# Patient Record
Sex: MALE | Race: WHITE | Employment: FULL TIME | URBAN - METROPOLITAN AREA
[De-identification: names, ages, dates, MRNs, and addresses within clinical notes are randomized per-mention and may not be internally consistent; named-entity substitution may affect disease eponyms.]

---

## 2017-02-09 ENCOUNTER — HOSPITAL ENCOUNTER (OUTPATIENT)
Age: 57
Setting detail: SPECIMEN
Discharge: HOME | End: 2017-02-09
Payer: COMMERCIAL

## 2017-02-09 DIAGNOSIS — E29.1 HYPOGONADISM MALE: ICD-10-CM

## 2017-02-09 DIAGNOSIS — E78.00 HYPERCHOLESTEROLEMIA: ICD-10-CM

## 2017-02-09 LAB
ABSOLUTE EOS #: 0.7 K/UL (ref 0–0.4)
ABSOLUTE LYMPH #: 1.1 K/UL (ref 1–4.8)
ABSOLUTE MONO #: 0.6 K/UL (ref 0.1–1.2)
ALT SERPL-CCNC: 27 U/L (ref 5–41)
AST SERPL-CCNC: 23 U/L
BASOPHILS # BLD: 1 % (ref 0–2)
BASOPHILS ABSOLUTE: 0 K/UL (ref 0–0.2)
CHOLESTEROL/HDL RATIO: 4.1
CHOLESTEROL: 206 MG/DL
DIFFERENTIAL TYPE: ABNORMAL
EOSINOPHILS RELATIVE PERCENT: 14 % (ref 1–4)
ESTRADIOL LEVEL: 8 PG/ML (ref 27–52)
HCT VFR BLD CALC: 46.5 % (ref 41–53)
HDLC SERPL-MCNC: 50 MG/DL
HEMOGLOBIN: 15.9 G/DL (ref 13.5–17.5)
LDL CHOLESTEROL: 130 MG/DL (ref 0–130)
LYMPHOCYTES # BLD: 21 % (ref 24–44)
MCH RBC QN AUTO: 30.8 PG (ref 26–34)
MCHC RBC AUTO-ENTMCNC: 34.1 G/DL (ref 31–37)
MCV RBC AUTO: 90.1 FL (ref 80–100)
MONOCYTES # BLD: 13 % (ref 2–11)
PDW BLD-RTO: 14.3 % (ref 12.5–15.4)
PLATELET # BLD: 169 K/UL (ref 140–450)
PLATELET ESTIMATE: ABNORMAL
PMV BLD AUTO: 8.7 FL (ref 6–12)
PROSTATE SPECIFIC ANTIGEN: 0.83 UG/L
RBC # BLD: 5.16 M/UL (ref 4.5–5.9)
RBC # BLD: ABNORMAL 10*6/UL
SEG NEUTROPHILS: 51 % (ref 36–66)
SEGMENTED NEUTROPHILS ABSOLUTE COUNT: 2.7 K/UL (ref 1.8–7.7)
SEX HORMONE BINDING GLOBULIN: 27 NMOL/L (ref 11–80)
TESTOSTERONE FREE-NONMALE: 48.6 PG/ML (ref 47–244)
TESTOSTERONE TOTAL: 226 NG/DL (ref 220–1000)
TRIGL SERPL-MCNC: 128 MG/DL
VLDLC SERPL CALC-MCNC: ABNORMAL MG/DL (ref 1–30)
WBC # BLD: 5.2 K/UL (ref 3.5–11)
WBC # BLD: ABNORMAL 10*3/UL

## 2017-07-18 ENCOUNTER — HOSPITAL ENCOUNTER (OUTPATIENT)
Age: 57
Setting detail: SPECIMEN
Discharge: HOME | End: 2017-07-18
Payer: COMMERCIAL

## 2017-07-18 DIAGNOSIS — E29.1 HYPOGONADISM MALE: ICD-10-CM

## 2017-07-18 LAB
ABSOLUTE EOS #: 0.6 K/UL (ref 0–0.4)
ABSOLUTE LYMPH #: 1.3 K/UL (ref 1–4.8)
ABSOLUTE MONO #: 0.6 K/UL (ref 0.1–1.2)
ALT SERPL-CCNC: 43 U/L (ref 5–41)
AST SERPL-CCNC: 26 U/L
BASOPHILS # BLD: 0 %
BASOPHILS ABSOLUTE: 0 K/UL (ref 0–0.2)
DIFFERENTIAL TYPE: ABNORMAL
EOSINOPHILS RELATIVE PERCENT: 10 %
ESTRADIOL LEVEL: 7 PG/ML (ref 27–52)
HCT VFR BLD CALC: 44.4 % (ref 41–53)
HEMOGLOBIN: 15.6 G/DL (ref 13.5–17.5)
LYMPHOCYTES # BLD: 22 %
MCH RBC QN AUTO: 30.9 PG (ref 26–34)
MCHC RBC AUTO-ENTMCNC: 35.1 G/DL (ref 31–37)
MCV RBC AUTO: 88 FL (ref 80–100)
MONOCYTES # BLD: 11 %
PDW BLD-RTO: 13.1 % (ref 12.5–15.4)
PLATELET # BLD: 176 K/UL (ref 140–450)
PLATELET ESTIMATE: ABNORMAL
PMV BLD AUTO: 8.8 FL (ref 6–12)
PROSTATE SPECIFIC ANTIGEN: 0.77 UG/L
RBC # BLD: 5.04 M/UL (ref 4.5–5.9)
RBC # BLD: ABNORMAL 10*6/UL
SEG NEUTROPHILS: 57 %
SEGMENTED NEUTROPHILS ABSOLUTE COUNT: 3.3 K/UL (ref 1.8–7.7)
SEX HORMONE BINDING GLOBULIN: 28 NMOL/L (ref 11–80)
TESTOSTERONE FREE-NONMALE: 31.6 PG/ML (ref 47–244)
TESTOSTERONE TOTAL: 154 NG/DL (ref 220–1000)
WBC # BLD: 5.8 K/UL (ref 3.5–11)
WBC # BLD: ABNORMAL 10*3/UL

## 2017-11-29 ENCOUNTER — HOSPITAL ENCOUNTER (OUTPATIENT)
Age: 57
Setting detail: SPECIMEN
Discharge: HOME | End: 2017-11-29
Payer: COMMERCIAL

## 2017-11-29 DIAGNOSIS — E29.1 HYPOGONADISM MALE: ICD-10-CM

## 2017-11-29 LAB
ABSOLUTE EOS #: 0.34 K/UL (ref 0–0.44)
ABSOLUTE IMMATURE GRANULOCYTE: 0.03 K/UL (ref 0–0.3)
ABSOLUTE LYMPH #: 1.13 K/UL (ref 1.1–3.7)
ABSOLUTE MONO #: 0.59 K/UL (ref 0.1–1.2)
ALT SERPL-CCNC: 28 U/L (ref 5–41)
AST SERPL-CCNC: 22 U/L
BASOPHILS # BLD: 1 % (ref 0–2)
BASOPHILS ABSOLUTE: 0.03 K/UL (ref 0–0.2)
DIFFERENTIAL TYPE: ABNORMAL
EOSINOPHILS RELATIVE PERCENT: 7 % (ref 1–4)
ESTRADIOL LEVEL: <5 PG/ML (ref 27–52)
HCT VFR BLD CALC: 47.5 % (ref 40.7–50.3)
HEMOGLOBIN: 15.9 G/DL (ref 13–17)
IMMATURE GRANULOCYTES: 1 %
LYMPHOCYTES # BLD: 22 % (ref 24–43)
MCH RBC QN AUTO: 30.2 PG (ref 25.2–33.5)
MCHC RBC AUTO-ENTMCNC: 33.5 G/DL (ref 28.4–34.8)
MCV RBC AUTO: 90.1 FL (ref 82.6–102.9)
MONOCYTES # BLD: 11 % (ref 3–12)
PDW BLD-RTO: 12.2 % (ref 11.8–14.4)
PLATELET # BLD: 186 K/UL (ref 138–453)
PLATELET ESTIMATE: ABNORMAL
PMV BLD AUTO: 10.4 FL (ref 8.1–13.5)
PROSTATE SPECIFIC ANTIGEN: 0.91 UG/L
RBC # BLD: 5.27 M/UL (ref 4.21–5.77)
RBC # BLD: ABNORMAL 10*6/UL
SEG NEUTROPHILS: 58 % (ref 36–65)
SEGMENTED NEUTROPHILS ABSOLUTE COUNT: 3.13 K/UL (ref 1.5–8.1)
SEX HORMONE BINDING GLOBULIN: 30 NMOL/L (ref 11–80)
TESTOSTERONE FREE-NONMALE: 23 PG/ML (ref 47–244)
TESTOSTERONE TOTAL: 118 NG/DL (ref 220–1000)
WBC # BLD: 5.3 K/UL (ref 3.5–11.3)
WBC # BLD: ABNORMAL 10*3/UL

## 2018-03-26 ENCOUNTER — HOSPITAL ENCOUNTER (OUTPATIENT)
Age: 58
Setting detail: SPECIMEN
Discharge: HOME | End: 2018-03-26
Payer: COMMERCIAL

## 2018-03-27 LAB
CULTURE: NO GROWTH
CULTURE: NORMAL
Lab: NORMAL
SPECIMEN DESCRIPTION: NORMAL
STATUS: NORMAL

## 2018-06-06 ENCOUNTER — HOSPITAL ENCOUNTER (OUTPATIENT)
Age: 58
Setting detail: SPECIMEN
Discharge: HOME | End: 2018-06-06
Payer: COMMERCIAL

## 2018-06-06 DIAGNOSIS — E29.1 HYPOGONADISM MALE: ICD-10-CM

## 2018-06-06 LAB
ABSOLUTE EOS #: 0.46 K/UL (ref 0–0.44)
ABSOLUTE IMMATURE GRANULOCYTE: <0.03 K/UL (ref 0–0.3)
ABSOLUTE LYMPH #: 1.15 K/UL (ref 1.1–3.7)
ABSOLUTE MONO #: 0.61 K/UL (ref 0.1–1.2)
ALT SERPL-CCNC: 31 U/L (ref 5–41)
AST SERPL-CCNC: 23 U/L
BASOPHILS # BLD: 1 % (ref 0–2)
BASOPHILS ABSOLUTE: 0.04 K/UL (ref 0–0.2)
DIFFERENTIAL TYPE: ABNORMAL
EOSINOPHILS RELATIVE PERCENT: 8 % (ref 1–4)
ESTRADIOL LEVEL: 17 PG/ML (ref 27–52)
HCT VFR BLD CALC: 48.6 % (ref 40.7–50.3)
HEMOGLOBIN: 16.1 G/DL (ref 13–17)
IMMATURE GRANULOCYTES: 0 %
LYMPHOCYTES # BLD: 21 % (ref 24–43)
MCH RBC QN AUTO: 30 PG (ref 25.2–33.5)
MCHC RBC AUTO-ENTMCNC: 33.1 G/DL (ref 28.4–34.8)
MCV RBC AUTO: 90.7 FL (ref 82.6–102.9)
MONOCYTES # BLD: 11 % (ref 3–12)
NRBC AUTOMATED: 0 PER 100 WBC
PDW BLD-RTO: 12.7 % (ref 11.8–14.4)
PLATELET # BLD: 190 K/UL (ref 138–453)
PLATELET ESTIMATE: ABNORMAL
PMV BLD AUTO: 10.4 FL (ref 8.1–13.5)
PROSTATE SPECIFIC ANTIGEN: 1.11 UG/L
RBC # BLD: 5.36 M/UL (ref 4.21–5.77)
RBC # BLD: ABNORMAL 10*6/UL
SEG NEUTROPHILS: 59 % (ref 36–65)
SEGMENTED NEUTROPHILS ABSOLUTE COUNT: 3.18 K/UL (ref 1.5–8.1)
SEX HORMONE BINDING GLOBULIN: 28 NMOL/L (ref 11–80)
TESTOSTERONE FREE-NONMALE: 73.9 PG/ML (ref 47–244)
TESTOSTERONE TOTAL: 338 NG/DL (ref 220–1000)
WBC # BLD: 5.5 K/UL (ref 3.5–11.3)
WBC # BLD: ABNORMAL 10*3/UL

## 2018-10-01 ENCOUNTER — HOSPITAL ENCOUNTER (OUTPATIENT)
Dept: GENERAL RADIOLOGY | Facility: CLINIC | Age: 58
Discharge: HOME | End: 2018-10-03
Payer: COMMERCIAL

## 2018-10-01 ENCOUNTER — HOSPITAL ENCOUNTER (OUTPATIENT)
Age: 58
Setting detail: SPECIMEN
Discharge: HOME | End: 2018-10-01
Payer: COMMERCIAL

## 2018-10-01 DIAGNOSIS — M54.50 RIGHT-SIDED LOW BACK PAIN WITHOUT SCIATICA, UNSPECIFIED CHRONICITY: ICD-10-CM

## 2018-10-01 PROCEDURE — 72100 X-RAY EXAM L-S SPINE 2/3 VWS: CPT

## 2018-10-01 PROCEDURE — 72070 X-RAY EXAM THORAC SPINE 2VWS: CPT

## 2018-10-03 LAB
CULTURE: NO GROWTH
Lab: NORMAL
SPECIMEN DESCRIPTION: NORMAL
STATUS: NORMAL

## 2018-11-01 ENCOUNTER — HOSPITAL ENCOUNTER (OUTPATIENT)
Age: 58
Setting detail: SPECIMEN
Discharge: HOME | End: 2018-11-01
Payer: COMMERCIAL

## 2018-11-01 DIAGNOSIS — E29.1 HYPOGONADISM MALE: ICD-10-CM

## 2018-11-01 LAB
ABSOLUTE EOS #: 0.38 K/UL (ref 0–0.44)
ABSOLUTE IMMATURE GRANULOCYTE: <0.03 K/UL (ref 0–0.3)
ABSOLUTE LYMPH #: 0.99 K/UL (ref 1.1–3.7)
ABSOLUTE MONO #: 0.45 K/UL (ref 0.1–1.2)
ALT SERPL-CCNC: 37 U/L (ref 5–41)
AST SERPL-CCNC: 27 U/L
BASOPHILS # BLD: 1 % (ref 0–2)
BASOPHILS ABSOLUTE: 0.04 K/UL (ref 0–0.2)
DIFFERENTIAL TYPE: ABNORMAL
EOSINOPHILS RELATIVE PERCENT: 8 % (ref 1–4)
ESTRADIOL LEVEL: 18 PG/ML (ref 27–52)
HCT VFR BLD CALC: 50.2 % (ref 40.7–50.3)
HEMOGLOBIN: 17.2 G/DL (ref 13–17)
IMMATURE GRANULOCYTES: 0 %
LYMPHOCYTES # BLD: 21 % (ref 24–43)
MCH RBC QN AUTO: 31.1 PG (ref 25.2–33.5)
MCHC RBC AUTO-ENTMCNC: 34.3 G/DL (ref 28.4–34.8)
MCV RBC AUTO: 90.8 FL (ref 82.6–102.9)
MONOCYTES # BLD: 10 % (ref 3–12)
NRBC AUTOMATED: 0 PER 100 WBC
PDW BLD-RTO: 12.2 % (ref 11.8–14.4)
PLATELET # BLD: 171 K/UL (ref 138–453)
PLATELET ESTIMATE: ABNORMAL
PMV BLD AUTO: 10.7 FL (ref 8.1–13.5)
PROSTATE SPECIFIC ANTIGEN: 1.19 UG/L
RBC # BLD: 5.53 M/UL (ref 4.21–5.77)
RBC # BLD: ABNORMAL 10*6/UL
SEG NEUTROPHILS: 60 % (ref 36–65)
SEGMENTED NEUTROPHILS ABSOLUTE COUNT: 2.77 K/UL (ref 1.5–8.1)
SEX HORMONE BINDING GLOBULIN: 28 NMOL/L (ref 11–80)
TESTOSTERONE FREE-NONMALE: 64.8 PG/ML (ref 47–244)
TESTOSTERONE TOTAL: 300 NG/DL (ref 220–1000)
WBC # BLD: 4.7 K/UL (ref 3.5–11.3)
WBC # BLD: ABNORMAL 10*3/UL

## 2018-11-02 ENCOUNTER — HOSPITAL ENCOUNTER (OUTPATIENT)
Age: 58
Setting detail: SPECIMEN
Discharge: HOME | End: 2018-11-02
Payer: COMMERCIAL

## 2018-11-09 LAB — SURGICAL PATHOLOGY REPORT: NORMAL

## 2019-05-09 ENCOUNTER — HOSPITAL ENCOUNTER (OUTPATIENT)
Age: 59
Setting detail: SPECIMEN
Discharge: HOME | End: 2019-05-09
Payer: COMMERCIAL

## 2019-05-09 DIAGNOSIS — E29.1 HYPOGONADISM MALE: ICD-10-CM

## 2019-05-09 LAB
ABSOLUTE EOS #: 0.27 K/UL (ref 0–0.44)
ABSOLUTE IMMATURE GRANULOCYTE: <0.03 K/UL (ref 0–0.3)
ABSOLUTE LYMPH #: 0.94 K/UL (ref 1.1–3.7)
ABSOLUTE MONO #: 0.58 K/UL (ref 0.1–1.2)
ALT SERPL-CCNC: 32 U/L (ref 5–41)
AST SERPL-CCNC: 26 U/L
BASOPHILS # BLD: 1 % (ref 0–2)
BASOPHILS ABSOLUTE: 0.04 K/UL (ref 0–0.2)
DIFFERENTIAL TYPE: ABNORMAL
EOSINOPHILS RELATIVE PERCENT: 5 % (ref 1–4)
ESTRADIOL LEVEL: 18 PG/ML (ref 27–52)
HCT VFR BLD CALC: 50.7 % (ref 40.7–50.3)
HEMOGLOBIN: 16.3 G/DL (ref 13–17)
IMMATURE GRANULOCYTES: 0 %
LYMPHOCYTES # BLD: 19 % (ref 24–43)
MCH RBC QN AUTO: 30.1 PG (ref 25.2–33.5)
MCHC RBC AUTO-ENTMCNC: 32.1 G/DL (ref 28.4–34.8)
MCV RBC AUTO: 93.7 FL (ref 82.6–102.9)
MONOCYTES # BLD: 12 % (ref 3–12)
NRBC AUTOMATED: 0 PER 100 WBC
PDW BLD-RTO: 13 % (ref 11.8–14.4)
PLATELET # BLD: 167 K/UL (ref 138–453)
PLATELET ESTIMATE: ABNORMAL
PMV BLD AUTO: 10.8 FL (ref 8.1–13.5)
PROSTATE SPECIFIC ANTIGEN: 1.17 UG/L
RBC # BLD: 5.41 M/UL (ref 4.21–5.77)
RBC # BLD: ABNORMAL 10*6/UL
SEG NEUTROPHILS: 63 % (ref 36–65)
SEGMENTED NEUTROPHILS ABSOLUTE COUNT: 3.2 K/UL (ref 1.5–8.1)
SEX HORMONE BINDING GLOBULIN: 30 NMOL/L (ref 11–80)
TESTOSTERONE FREE-NONMALE: 81.1 PG/ML (ref 47–244)
TESTOSTERONE TOTAL: 380 NG/DL (ref 220–1000)
WBC # BLD: 5.1 K/UL (ref 3.5–11.3)
WBC # BLD: ABNORMAL 10*3/UL

## 2019-06-13 ENCOUNTER — HOSPITAL ENCOUNTER (OUTPATIENT)
Age: 59
Setting detail: SPECIMEN
Discharge: HOME | End: 2019-06-13
Payer: COMMERCIAL

## 2019-06-13 DIAGNOSIS — I10 ESSENTIAL HYPERTENSION: ICD-10-CM

## 2019-06-13 DIAGNOSIS — M10.9 GOUT INVOLVING TOE OF RIGHT FOOT, UNSPECIFIED CAUSE, UNSPECIFIED CHRONICITY: ICD-10-CM

## 2019-06-13 LAB
ANION GAP SERPL CALCULATED.3IONS-SCNC: 16 MMOL/L (ref 9–17)
BUN BLDV-MCNC: 17 MG/DL (ref 8–23)
BUN/CREAT BLD: ABNORMAL (ref 9–20)
CALCIUM SERPL-MCNC: 9.5 MG/DL (ref 8.6–10.4)
CHLORIDE BLD-SCNC: 104 MMOL/L (ref 98–107)
CO2: 25 MMOL/L (ref 20–31)
CREAT SERPL-MCNC: 0.74 MG/DL (ref 0.7–1.2)
GFR AFRICAN AMERICAN: >60 ML/MIN
GFR NON-AFRICAN AMERICAN: >60 ML/MIN
GFR SERPL CREATININE-BSD FRML MDRD: ABNORMAL ML/MIN/{1.73_M2}
GFR SERPL CREATININE-BSD FRML MDRD: ABNORMAL ML/MIN/{1.73_M2}
GLUCOSE FASTING: 86 MG/DL (ref 70–99)
POTASSIUM SERPL-SCNC: 4.8 MMOL/L (ref 3.7–5.3)
SODIUM BLD-SCNC: 145 MMOL/L (ref 135–144)
URIC ACID: 8 MG/DL (ref 3.4–7)

## 2019-11-21 ENCOUNTER — HOSPITAL ENCOUNTER (OUTPATIENT)
Age: 59
Setting detail: SPECIMEN
Discharge: HOME | End: 2019-11-21
Payer: COMMERCIAL

## 2019-11-21 DIAGNOSIS — E29.1 HYPOGONADISM MALE: ICD-10-CM

## 2019-11-21 LAB
ABSOLUTE EOS #: 0.36 K/UL (ref 0–0.44)
ABSOLUTE IMMATURE GRANULOCYTE: <0.03 K/UL (ref 0–0.3)
ABSOLUTE LYMPH #: 1.08 K/UL (ref 1.1–3.7)
ABSOLUTE MONO #: 0.58 K/UL (ref 0.1–1.2)
ALT SERPL-CCNC: 33 U/L (ref 5–41)
AST SERPL-CCNC: 26 U/L
BASOPHILS # BLD: 1 % (ref 0–2)
BASOPHILS ABSOLUTE: 0.03 K/UL (ref 0–0.2)
DIFFERENTIAL TYPE: ABNORMAL
EOSINOPHILS RELATIVE PERCENT: 8 % (ref 1–4)
ESTRADIOL LEVEL: 24 PG/ML (ref 27–52)
HCT VFR BLD CALC: 45.3 % (ref 40.7–50.3)
HEMOGLOBIN: 15.7 G/DL (ref 13–17)
IMMATURE GRANULOCYTES: 0 %
LYMPHOCYTES # BLD: 23 % (ref 24–43)
MCH RBC QN AUTO: 31.5 PG (ref 25.2–33.5)
MCHC RBC AUTO-ENTMCNC: 34.7 G/DL (ref 28.4–34.8)
MCV RBC AUTO: 91 FL (ref 82.6–102.9)
MONOCYTES # BLD: 13 % (ref 3–12)
NRBC AUTOMATED: 0 PER 100 WBC
PDW BLD-RTO: 12.2 % (ref 11.8–14.4)
PLATELET # BLD: 161 K/UL (ref 138–453)
PLATELET ESTIMATE: ABNORMAL
PMV BLD AUTO: 10.4 FL (ref 8.1–13.5)
PROSTATE SPECIFIC ANTIGEN: 1.18 UG/L
RBC # BLD: 4.98 M/UL (ref 4.21–5.77)
RBC # BLD: ABNORMAL 10*6/UL
SEG NEUTROPHILS: 55 % (ref 36–65)
SEGMENTED NEUTROPHILS ABSOLUTE COUNT: 2.58 K/UL (ref 1.5–8.1)
SEX HORMONE BINDING GLOBULIN: 19 NMOL/L (ref 11–80)
TESTOSTERONE FREE-NONMALE: 144.7 PG/ML (ref 47–244)
TESTOSTERONE TOTAL: 522 NG/DL (ref 220–1000)
WBC # BLD: 4.7 K/UL (ref 3.5–11.3)
WBC # BLD: ABNORMAL 10*3/UL

## 2020-02-04 PROBLEM — M48.00 SPINAL STENOSIS: Status: ACTIVE | Noted: 2020-02-04

## 2020-02-04 PROBLEM — D30.3 BENIGN BLADDER TUMOR: Status: ACTIVE | Noted: 2020-02-04

## 2020-03-11 ENCOUNTER — HOSPITAL ENCOUNTER (OUTPATIENT)
Dept: CT IMAGING | Age: 60
Discharge: HOME | End: 2020-03-13
Payer: COMMERCIAL

## 2020-03-11 LAB
CREAT SERPL-MCNC: 0.69 MG/DL (ref 0.7–1.2)
GFR AFRICAN AMERICAN: >60 ML/MIN
GFR NON-AFRICAN AMERICAN: >60 ML/MIN
GFR SERPL CREATININE-BSD FRML MDRD: ABNORMAL ML/MIN/{1.73_M2}
GFR SERPL CREATININE-BSD FRML MDRD: ABNORMAL ML/MIN/{1.73_M2}

## 2020-03-11 PROCEDURE — 6360000004 HC RX CONTRAST MEDICATION: Performed by: FAMILY MEDICINE

## 2020-03-11 PROCEDURE — 82565 ASSAY OF CREATININE: CPT

## 2020-03-11 PROCEDURE — 2580000003 HC RX 258: Performed by: FAMILY MEDICINE

## 2020-03-11 PROCEDURE — 36415 COLL VENOUS BLD VENIPUNCTURE: CPT

## 2020-03-11 PROCEDURE — 74177 CT ABD & PELVIS W/CONTRAST: CPT

## 2020-03-11 RX ORDER — SODIUM CHLORIDE 0.9 % (FLUSH) 0.9 %
10 SYRINGE (ML) INJECTION PRN
Status: DISCONTINUED | OUTPATIENT
Start: 2020-03-11 | End: 2020-03-14 | Stop reason: HOSPADM

## 2020-03-11 RX ORDER — 0.9 % SODIUM CHLORIDE 0.9 %
80 INTRAVENOUS SOLUTION INTRAVENOUS ONCE
Status: COMPLETED | OUTPATIENT
Start: 2020-03-11 | End: 2020-03-11

## 2020-03-11 RX ADMIN — SODIUM CHLORIDE 80 ML: 9 INJECTION, SOLUTION INTRAVENOUS at 14:42

## 2020-03-11 RX ADMIN — Medication 10 ML: at 14:42

## 2020-03-11 RX ADMIN — IOHEXOL 30 ML: 300 INJECTION, SOLUTION INTRAVENOUS at 14:42

## 2020-03-11 RX ADMIN — IOPAMIDOL 75 ML: 755 INJECTION, SOLUTION INTRAVENOUS at 14:42

## 2020-03-17 ENCOUNTER — HOSPITAL ENCOUNTER (OUTPATIENT)
Age: 60
Setting detail: SPECIMEN
Discharge: HOME | End: 2020-03-17
Payer: COMMERCIAL

## 2020-03-17 LAB
ABSOLUTE EOS #: 0.43 K/UL (ref 0–0.44)
ABSOLUTE IMMATURE GRANULOCYTE: <0.03 K/UL (ref 0–0.3)
ABSOLUTE LYMPH #: 1.16 K/UL (ref 1.1–3.7)
ABSOLUTE MONO #: 0.52 K/UL (ref 0.1–1.2)
ALT SERPL-CCNC: 31 U/L (ref 5–41)
AST SERPL-CCNC: 25 U/L
BASOPHILS # BLD: 1 % (ref 0–2)
BASOPHILS ABSOLUTE: 0.04 K/UL (ref 0–0.2)
DIFFERENTIAL TYPE: ABNORMAL
EOSINOPHILS RELATIVE PERCENT: 8 % (ref 1–4)
ESTRADIOL LEVEL: 27 PG/ML (ref 27–52)
HCT VFR BLD CALC: 50.4 % (ref 40.7–50.3)
HEMOGLOBIN: 17.4 G/DL (ref 13–17)
IMMATURE GRANULOCYTES: 0 %
LYMPHOCYTES # BLD: 22 % (ref 24–43)
MCH RBC QN AUTO: 30.9 PG (ref 25.2–33.5)
MCHC RBC AUTO-ENTMCNC: 34.5 G/DL (ref 28.4–34.8)
MCV RBC AUTO: 89.5 FL (ref 82.6–102.9)
MONOCYTES # BLD: 10 % (ref 3–12)
NRBC AUTOMATED: 0 PER 100 WBC
PDW BLD-RTO: 12.3 % (ref 11.8–14.4)
PLATELET # BLD: 200 K/UL (ref 138–453)
PLATELET ESTIMATE: ABNORMAL
PMV BLD AUTO: 10.4 FL (ref 8.1–13.5)
PROSTATE SPECIFIC ANTIGEN: 1.22 UG/L
RBC # BLD: 5.63 M/UL (ref 4.21–5.77)
RBC # BLD: ABNORMAL 10*6/UL
SEG NEUTROPHILS: 59 % (ref 36–65)
SEGMENTED NEUTROPHILS ABSOLUTE COUNT: 3.11 K/UL (ref 1.5–8.1)
SEX HORMONE BINDING GLOBULIN: 25 NMOL/L (ref 11–80)
TESTOSTERONE FREE-NONMALE: 110.1 PG/ML (ref 47–244)
TESTOSTERONE TOTAL: 457 NG/DL (ref 220–1000)
WBC # BLD: 5.3 K/UL (ref 3.5–11.3)
WBC # BLD: ABNORMAL 10*3/UL

## 2020-11-20 ENCOUNTER — HOSPITAL ENCOUNTER (OUTPATIENT)
Age: 60
Setting detail: SPECIMEN
Discharge: HOME | End: 2020-11-20
Payer: COMMERCIAL

## 2020-11-20 LAB
ABSOLUTE EOS #: 0.24 K/UL (ref 0–0.44)
ABSOLUTE IMMATURE GRANULOCYTE: <0.03 K/UL (ref 0–0.3)
ABSOLUTE LYMPH #: 0.97 K/UL (ref 1.1–3.7)
ABSOLUTE MONO #: 0.43 K/UL (ref 0.1–1.2)
ALT SERPL-CCNC: 25 U/L (ref 5–41)
AST SERPL-CCNC: 21 U/L
BASOPHILS # BLD: 1 % (ref 0–2)
BASOPHILS ABSOLUTE: 0.03 K/UL (ref 0–0.2)
DIFFERENTIAL TYPE: ABNORMAL
EOSINOPHILS RELATIVE PERCENT: 6 % (ref 1–4)
ESTRADIOL LEVEL: 5 PG/ML (ref 27–52)
HCT VFR BLD CALC: 43.4 % (ref 40.7–50.3)
HEMOGLOBIN: 15.3 G/DL (ref 13–17)
IMMATURE GRANULOCYTES: 0 %
LYMPHOCYTES # BLD: 24 % (ref 24–43)
MCH RBC QN AUTO: 31.3 PG (ref 25.2–33.5)
MCHC RBC AUTO-ENTMCNC: 35.3 G/DL (ref 28.4–34.8)
MCV RBC AUTO: 88.8 FL (ref 82.6–102.9)
MONOCYTES # BLD: 11 % (ref 3–12)
NRBC AUTOMATED: 0 PER 100 WBC
PDW BLD-RTO: 11.8 % (ref 11.8–14.4)
PLATELET # BLD: 188 K/UL (ref 138–453)
PLATELET ESTIMATE: ABNORMAL
PMV BLD AUTO: 10.5 FL (ref 8.1–13.5)
PROSTATE SPECIFIC ANTIGEN: 1.23 UG/L
RBC # BLD: 4.89 M/UL (ref 4.21–5.77)
RBC # BLD: ABNORMAL 10*6/UL
SEG NEUTROPHILS: 58 % (ref 36–65)
SEGMENTED NEUTROPHILS ABSOLUTE COUNT: 2.42 K/UL (ref 1.5–8.1)
SEX HORMONE BINDING GLOBULIN: 26 NMOL/L (ref 11–80)
TESTOSTERONE FREE-NONMALE: 30.7 PG/ML (ref 47–244)
TESTOSTERONE TOTAL: 144 NG/DL (ref 220–1000)
WBC # BLD: 4.1 K/UL (ref 3.5–11.3)
WBC # BLD: ABNORMAL 10*3/UL

## 2020-12-10 ENCOUNTER — HOSPITAL ENCOUNTER (OUTPATIENT)
Dept: MRI IMAGING | Facility: CLINIC | Age: 60
Discharge: HOME | End: 2020-12-12
Payer: COMMERCIAL

## 2020-12-10 LAB — POC CREATININE: 0.8 MG/DL (ref 0.6–1.4)

## 2020-12-10 PROCEDURE — 74183 MRI ABD W/O CNTR FLWD CNTR: CPT

## 2020-12-10 PROCEDURE — A9579 GAD-BASE MR CONTRAST NOS,1ML: HCPCS | Performed by: INTERNAL MEDICINE

## 2020-12-10 PROCEDURE — 6360000004 HC RX CONTRAST MEDICATION: Performed by: INTERNAL MEDICINE

## 2020-12-10 PROCEDURE — 82565 ASSAY OF CREATININE: CPT

## 2020-12-10 RX ADMIN — GADOTERIDOL 20 ML: 279.3 INJECTION, SOLUTION INTRAVENOUS at 12:21

## 2021-01-18 ENCOUNTER — HOSPITAL ENCOUNTER (OUTPATIENT)
Age: 61
Setting detail: SPECIMEN
Discharge: HOME | End: 2021-01-18
Payer: COMMERCIAL

## 2021-01-18 LAB
ALBUMIN SERPL-MCNC: 4.2 G/DL (ref 3.5–5.2)
ALBUMIN/GLOBULIN RATIO: 1.6 (ref 1–2.5)
ALP BLD-CCNC: 48 U/L (ref 40–129)
ALT SERPL-CCNC: 19 U/L (ref 5–41)
AMYLASE: 99 U/L (ref 28–100)
AST SERPL-CCNC: 18 U/L
BILIRUB SERPL-MCNC: 0.39 MG/DL (ref 0.3–1.2)
BILIRUBIN DIRECT: 0.1 MG/DL
BILIRUBIN, INDIRECT: 0.29 MG/DL (ref 0–1)
C-REACTIVE PROTEIN: <3 MG/L (ref 0–5)
GLOBULIN: NORMAL G/DL (ref 1.5–3.8)
IGA: 289 MG/DL (ref 70–400)
LIPASE: 29 U/L (ref 13–60)
SEDIMENTATION RATE, ERYTHROCYTE: 6 MM (ref 0–20)
TOTAL PROTEIN: 6.9 G/DL (ref 6.4–8.3)

## 2021-01-19 LAB
TISSUE TRANSGLUTAMINASE ANTIBODY IGG: <0.6 U/ML
TISSUE TRANSGLUTAMINASE IGA: 0.7 U/ML

## 2021-02-09 ENCOUNTER — HOSPITAL ENCOUNTER (OUTPATIENT)
Dept: GENERAL RADIOLOGY | Facility: CLINIC | Age: 61
Discharge: HOME | End: 2021-02-11
Payer: COMMERCIAL

## 2021-02-09 DIAGNOSIS — R07.9 CHEST PAIN, UNSPECIFIED TYPE: ICD-10-CM

## 2021-02-09 PROBLEM — K29.80 DUODENITIS: Status: ACTIVE | Noted: 2021-02-09

## 2021-02-09 PROBLEM — K21.00 GASTROESOPHAGEAL REFLUX DISEASE WITH ESOPHAGITIS WITHOUT HEMORRHAGE: Status: ACTIVE | Noted: 2021-02-09

## 2021-02-09 PROCEDURE — 71046 X-RAY EXAM CHEST 2 VIEWS: CPT

## 2021-03-22 ENCOUNTER — HOSPITAL ENCOUNTER (OUTPATIENT)
Dept: NUCLEAR MEDICINE | Age: 61
Discharge: HOME | End: 2021-03-24
Payer: COMMERCIAL

## 2021-03-22 VITALS — WEIGHT: 244.93 LBS | BODY MASS INDEX: 33.22 KG/M2

## 2021-03-22 DIAGNOSIS — R10.11 RIGHT UPPER QUADRANT PAIN: ICD-10-CM

## 2021-03-22 PROCEDURE — 78227 HEPATOBIL SYST IMAGE W/DRUG: CPT

## 2021-03-22 PROCEDURE — 3430000000 HC RX DIAGNOSTIC RADIOPHARMACEUTICAL: Performed by: INTERNAL MEDICINE

## 2021-03-22 PROCEDURE — 6360000004 HC RX CONTRAST MEDICATION: Performed by: INTERNAL MEDICINE

## 2021-03-22 PROCEDURE — 2580000003 HC RX 258: Performed by: INTERNAL MEDICINE

## 2021-03-22 PROCEDURE — A9537 TC99M MEBROFENIN: HCPCS | Performed by: INTERNAL MEDICINE

## 2021-03-22 RX ADMIN — SODIUM CHLORIDE 2.22 MCG: 9 INJECTION, SOLUTION INTRAVENOUS at 10:52

## 2021-03-22 RX ADMIN — Medication 5.5 MILLICURIE: at 09:45

## 2021-04-12 ENCOUNTER — HOSPITAL ENCOUNTER (OUTPATIENT)
Age: 61
Setting detail: SPECIMEN
Discharge: HOME | End: 2021-04-12
Payer: COMMERCIAL

## 2021-04-12 DIAGNOSIS — E29.1 HYPOGONADISM MALE: ICD-10-CM

## 2021-04-12 LAB
ABSOLUTE EOS #: 0.22 K/UL (ref 0–0.44)
ABSOLUTE IMMATURE GRANULOCYTE: <0.03 K/UL (ref 0–0.3)
ABSOLUTE LYMPH #: 1.11 K/UL (ref 1.1–3.7)
ABSOLUTE MONO #: 0.55 K/UL (ref 0.1–1.2)
ALT SERPL-CCNC: 18 U/L (ref 5–41)
AST SERPL-CCNC: 22 U/L
BASOPHILS # BLD: 1 % (ref 0–2)
BASOPHILS ABSOLUTE: 0.04 K/UL (ref 0–0.2)
DIFFERENTIAL TYPE: ABNORMAL
EOSINOPHILS RELATIVE PERCENT: 4 % (ref 1–4)
ESTRADIOL LEVEL: 26 PG/ML (ref 27–52)
HCT VFR BLD CALC: 50.6 % (ref 40.7–50.3)
HEMOGLOBIN: 17.6 G/DL (ref 13–17)
IMMATURE GRANULOCYTES: 0 %
LYMPHOCYTES # BLD: 20 % (ref 24–43)
MCH RBC QN AUTO: 31 PG (ref 25.2–33.5)
MCHC RBC AUTO-ENTMCNC: 34.8 G/DL (ref 28.4–34.8)
MCV RBC AUTO: 89.1 FL (ref 82.6–102.9)
MONOCYTES # BLD: 10 % (ref 3–12)
NRBC AUTOMATED: 0 PER 100 WBC
PDW BLD-RTO: 12 % (ref 11.8–14.4)
PLATELET # BLD: 180 K/UL (ref 138–453)
PLATELET ESTIMATE: ABNORMAL
PMV BLD AUTO: 10.2 FL (ref 8.1–13.5)
PROSTATE SPECIFIC ANTIGEN: 1.42 UG/L
RBC # BLD: 5.68 M/UL (ref 4.21–5.77)
RBC # BLD: ABNORMAL 10*6/UL
SEG NEUTROPHILS: 65 % (ref 36–65)
SEGMENTED NEUTROPHILS ABSOLUTE COUNT: 3.72 K/UL (ref 1.5–8.1)
SEX HORMONE BINDING GLOBULIN: 28 NMOL/L (ref 11–80)
TESTOSTERONE FREE-NONMALE: 94.6 PG/ML (ref 47–244)
TESTOSTERONE TOTAL: 421 NG/DL (ref 220–1000)
WBC # BLD: 5.7 K/UL (ref 3.5–11.3)
WBC # BLD: ABNORMAL 10*3/UL

## 2021-04-28 ENCOUNTER — HOSPITAL ENCOUNTER (OUTPATIENT)
Age: 61
Setting detail: SPECIMEN
Discharge: HOME | End: 2021-04-28
Payer: COMMERCIAL

## 2021-04-28 DIAGNOSIS — E87.1 LOW SODIUM LEVELS: ICD-10-CM

## 2021-04-28 LAB
ANION GAP SERPL CALCULATED.3IONS-SCNC: 12 MMOL/L (ref 9–17)
CHLORIDE BLD-SCNC: 97 MMOL/L (ref 98–107)
CO2: 25 MMOL/L (ref 20–31)
POTASSIUM SERPL-SCNC: 4.3 MMOL/L (ref 3.7–5.3)
SODIUM BLD-SCNC: 134 MMOL/L (ref 135–144)

## 2021-06-21 ENCOUNTER — HOSPITAL ENCOUNTER (OUTPATIENT)
Dept: MRI IMAGING | Facility: CLINIC | Age: 61
Discharge: HOME | End: 2021-06-23
Payer: COMMERCIAL

## 2021-06-21 DIAGNOSIS — R52 PAIN: ICD-10-CM

## 2021-06-21 LAB — POC CREATININE: 0.8 MG/DL (ref 0.6–1.4)

## 2021-06-21 PROCEDURE — A9579 GAD-BASE MR CONTRAST NOS,1ML: HCPCS | Performed by: PHYSICIAN ASSISTANT

## 2021-06-21 PROCEDURE — 82565 ASSAY OF CREATININE: CPT

## 2021-06-21 PROCEDURE — 6360000004 HC RX CONTRAST MEDICATION: Performed by: PHYSICIAN ASSISTANT

## 2021-06-21 PROCEDURE — 72158 MRI LUMBAR SPINE W/O & W/DYE: CPT

## 2021-06-21 RX ADMIN — GADOTERIDOL 20 ML: 279.3 INJECTION, SOLUTION INTRAVENOUS at 14:14

## 2021-09-09 ENCOUNTER — HOSPITAL ENCOUNTER (OUTPATIENT)
Age: 61
Setting detail: SPECIMEN
Discharge: HOME | End: 2021-09-09
Payer: COMMERCIAL

## 2021-09-09 ENCOUNTER — HOSPITAL ENCOUNTER (OUTPATIENT)
Dept: MRI IMAGING | Facility: CLINIC | Age: 61
Discharge: HOME | End: 2021-09-11
Payer: COMMERCIAL

## 2021-09-09 DIAGNOSIS — M48.061 NEURAL FORAMINAL STENOSIS OF LUMBAR SPINE: ICD-10-CM

## 2021-09-09 DIAGNOSIS — E29.1 HYPOGONADISM MALE: ICD-10-CM

## 2021-09-09 DIAGNOSIS — M54.16 LUMBAR RADICULOPATHY: ICD-10-CM

## 2021-09-09 DIAGNOSIS — Z98.890 S/P LAMINECTOMY: ICD-10-CM

## 2021-09-09 DIAGNOSIS — M43.16 SPONDYLOLISTHESIS OF LUMBAR REGION: ICD-10-CM

## 2021-09-09 LAB
ABSOLUTE EOS #: 0.22 K/UL (ref 0–0.44)
ABSOLUTE IMMATURE GRANULOCYTE: <0.03 K/UL (ref 0–0.3)
ABSOLUTE LYMPH #: 0.97 K/UL (ref 1.1–3.7)
ABSOLUTE MONO #: 0.44 K/UL (ref 0.1–1.2)
ALT SERPL-CCNC: 22 U/L (ref 5–41)
AST SERPL-CCNC: 21 U/L
BASOPHILS # BLD: 1 % (ref 0–2)
BASOPHILS ABSOLUTE: 0.03 K/UL (ref 0–0.2)
DIFFERENTIAL TYPE: ABNORMAL
EOSINOPHILS RELATIVE PERCENT: 5 % (ref 1–4)
ESTRADIOL LEVEL: <5 PG/ML (ref 27–52)
HCT VFR BLD CALC: 45.9 % (ref 40.7–50.3)
HEMOGLOBIN: 15.5 G/DL (ref 13–17)
IMMATURE GRANULOCYTES: 0 %
LYMPHOCYTES # BLD: 23 % (ref 24–43)
MCH RBC QN AUTO: 30.5 PG (ref 25.2–33.5)
MCHC RBC AUTO-ENTMCNC: 33.8 G/DL (ref 28.4–34.8)
MCV RBC AUTO: 90.4 FL (ref 82.6–102.9)
MONOCYTES # BLD: 11 % (ref 3–12)
NRBC AUTOMATED: 0 PER 100 WBC
PDW BLD-RTO: 12.3 % (ref 11.8–14.4)
PLATELET # BLD: 164 K/UL (ref 138–453)
PLATELET ESTIMATE: ABNORMAL
PMV BLD AUTO: 10.8 FL (ref 8.1–13.5)
PROSTATE SPECIFIC ANTIGEN: 1.07 UG/L
RBC # BLD: 5.08 M/UL (ref 4.21–5.77)
RBC # BLD: ABNORMAL 10*6/UL
SEG NEUTROPHILS: 60 % (ref 36–65)
SEGMENTED NEUTROPHILS ABSOLUTE COUNT: 2.48 K/UL (ref 1.5–8.1)
SEX HORMONE BINDING GLOBULIN: 35 NMOL/L (ref 11–80)
TESTOSTERONE FREE-NONMALE: 22.5 PG/ML (ref 47–244)
TESTOSTERONE TOTAL: 126 NG/DL (ref 220–1000)
WBC # BLD: 4.2 K/UL (ref 3.5–11.3)
WBC # BLD: ABNORMAL 10*3/UL

## 2021-09-09 PROCEDURE — 72141 MRI NECK SPINE W/O DYE: CPT

## 2021-09-10 ENCOUNTER — HOSPITAL ENCOUNTER (OUTPATIENT)
Dept: MRI IMAGING | Facility: CLINIC | Age: 61
Discharge: HOME | End: 2021-09-12
Payer: COMMERCIAL

## 2021-09-10 DIAGNOSIS — M54.16 LUMBAR RADICULOPATHY: ICD-10-CM

## 2021-09-10 DIAGNOSIS — M48.061 NEURAL FORAMINAL STENOSIS OF LUMBAR SPINE: ICD-10-CM

## 2021-09-10 DIAGNOSIS — M43.16 SPONDYLOLISTHESIS OF LUMBAR REGION: ICD-10-CM

## 2021-09-10 DIAGNOSIS — Z98.890 S/P LAMINECTOMY: ICD-10-CM

## 2021-09-10 PROCEDURE — 70551 MRI BRAIN STEM W/O DYE: CPT

## 2021-09-16 ENCOUNTER — OFFICE VISIT (OUTPATIENT)
Dept: NEUROLOGY | Age: 61
End: 2021-09-16
Payer: COMMERCIAL

## 2021-09-16 VITALS
HEIGHT: 72 IN | DIASTOLIC BLOOD PRESSURE: 97 MMHG | BODY MASS INDEX: 30.2 KG/M2 | HEART RATE: 57 BPM | SYSTOLIC BLOOD PRESSURE: 147 MMHG | WEIGHT: 223 LBS

## 2021-09-16 DIAGNOSIS — R25.1 TREMOR OF LEFT HAND: Primary | ICD-10-CM

## 2021-09-16 PROCEDURE — 99204 OFFICE O/P NEW MOD 45 MIN: CPT | Performed by: PSYCHIATRY & NEUROLOGY

## 2021-09-16 NOTE — LETTER
of systems done by staff reviewed and pertinent positives include rt facial twitching and slowness in adls and low volume speech. Current Outpatient Medications on File Prior to Visit   Medication Sig Dispense Refill    pravastatin (PRAVACHOL) 20 MG tablet take 1 tablet by mouth once daily 90 tablet 0    famotidine (PEPCID) 20 MG tablet take 1 tablet by mouth at bedtime if needed once daily      tamsulosin (FLOMAX) 0.4 MG capsule take 1 capsule by mouth every evening      cyclobenzaprine (FLEXERIL) 5 MG tablet take 1 tablet by mouth three times a day for 7 days if needed for muscle aches (Patient not taking: Reported on 9/16/2021)      gabapentin (NEURONTIN) 300 MG capsule Take 300 mg by mouth 3 times daily. (Patient not taking: Reported on 9/16/2021)      HYDROcodone-acetaminophen (NORCO) 5-325 MG per tablet take 1 tablet by mouth every 4 hours if needed for pain (Patient not taking: Reported on 9/16/2021)      ondansetron (ZOFRAN) 4 MG tablet take 1 tablet by mouth every 4 hours for 7 days if needed for nausea (Patient not taking: Reported on 9/16/2021)      RA SENNA 8.6 MG tablet TAKE 2 TABLETS BY MOUTH AT BEDTIME AS NEEDED FOR CONSTIPATION (Patient not taking: Reported on 9/16/2021)      traMADol (ULTRAM) 50 MG tablet take 1 tablet by mouth every 6 hours if needed for pain (Patient not taking: Reported on 9/16/2021)      predniSONE (DELTASONE) 20 MG tablet 2 bid for 3 days (Patient not taking: Reported on 9/16/2021) 12 tablet 0    pantoprazole (PROTONIX) 40 MG tablet take 1 tablet by mouth once daily (Patient not taking: Reported on 9/16/2021)      tadalafil (CIALIS) 20 MG tablet Take 20 mg by mouth daily as needed (Patient not taking: Reported on 9/16/2021)      Testosterone (ANDROGEL) 20.25 MG/1.25GM (1.62%) GEL Place 1 actuation onto the skin every morning for 30 days.  75 g 1     Current Facility-Administered Medications on File Prior to Visit   Medication Dose Route Frequency Provider Last Rate Last Admin    testosterone cypionate (DEPOTESTOTERONE CYPIONATE) injection 225 mg  225 mg IntraMUSCular Once Tejinder Ceron MD        testosterone cypionate (DEPOTESTOTERONE CYPIONATE) injection 100 mg  100 mg IntraMUSCular Once Tejinder Ceron MD        testosterone cypionate (DEPOTESTOTERONE CYPIONATE) injection 200 mg  200 mg IntraMUSCular Q14 Days Tejinder Ceron MD   200 mg at 12/02/14 1555    testosterone cypionate (DEPOTESTOTERONE CYPIONATE) injection 200 mg  200 mg IntraMUSCular Q14 Days Tejinder Ceron MD   200 mg at 11/13/14 1038     Allergies: Ryan Key is allergic to poison ivy extract. Past Medical History:   Diagnosis Date    Reflux esophagitis     Sleep apnea     Tremor        Past Surgical History:   Procedure Laterality Date    ABDOMINAL HERNIA REPAIR      APPENDECTOMY      BACK SURGERY      BLADDER SURGERY      ROTATOR CUFF REPAIR       Social History: Ryan Key  reports that he quit smoking about 13 years ago. He smoked 0.00 packs per day for 0.00 years. He has never used smokeless tobacco. He reports current alcohol use. He reports that he does not use drugs. Family History   Problem Relation Age of Onset    High Blood Pressure Mother     High Blood Pressure Father        On exam: Blood pressure (!) 147/97, pulse 57, height 6' (1.829 m), weight 223 lb (101.2 kg). NEUROLOGIC EXAMINATION  GENERAL  Appears comfortable and in no distress; hypomimic face   HEENT  NC/ AT   NECK  Supple and no bruits heard   MENTAL STATUS:  Alert, oriented, intact memory, no confusion, hypophonic speech, normal language, no hallucination or delusion   CRANIAL NERVES: II     -      PERRLA, Fundi reveal intact venous pulsations;  Visual fields intact to confrontation  III,IV,VI -  EOMs full, no afferent defect, no                      ALLI, no ptosis  V     -     Normal facial sensation  VII    -     Normal facial symmetry  VIII   -     Intact hearing  IX,X -     Symmetrical palate  XI    -     Symmetrical shoulder shrug  XII   -     Midline tongue, no atrophy    MOTOR FUNCTION:  significant for good strength of grade 5/5 in bilateral proximal and distal muscle groups of both upper and lower extremities with normal bulk, normal tone and no involuntary movements, no tremor   SENSORY FUNCTION:  Normal touch, normal pin, normal vibration, normal proprioception   CEREBELLAR FUNCTION:  He also has mild tremulousness of outstretched upper extremities, predominantly in left UE. He has associated mild cogwheeling and bradykinesia noted. No head tremor and no chin tremor. He has a low amplitude, high frequency postural tremor of left hand. It is much more predominant with actitivity. Mild tremulousness of left hand increases at the very end of goal-directed movements such as finger-to-nose testing. Tremulousness is much more pronounced on keeping hands on wing-beating position. REFLEX FUNCTION:  Symmetric, no perverted reflex, no Babinski sign   STATION and GAIT  He was able to a stand up with support and he does have diminished arm swing on left side. Diagnostic data reviewed with the patient:   MRI brain (without) 9/10/2021: No acute infarct, no mass-effect or midline shift. No evidence of bleed. Areas of T2 flair hyperintensity in periventricular and subcortical white matter suggestive of chronic microvascular ischemic changes. Impression and Plan: Mr. Jae Rivers is a 57 y.o. male with   Presentation raising concern for early onset parkinsonism with the predominant rigid variant; discussed about different tremors; will start him on carbidopa/levodopa 25/100 tab half tab bid for 1wk, then tid  Questions about natural medicine for tremors discussed.   Follow-up in 2 months      Please note that portions of this note were completed with a voice recognition program.  Although every effort was made to ensure the accuracy of this automated transcription, some errors in transcription may have occurred; occasionally words are mis-transcribed. Thank you for understanding. If you have questions, please do not hesitate to call me. I look forward to following Juanito Billy along with you.     Sincerely,      [place]ana Hi MD

## 2021-09-16 NOTE — PROGRESS NOTES
All items selected indicate a positive finding. Those items not selected are negative.   Constitutional [x] Weight loss/gain   [] Fatigue  [] Fever/Chills   HEENT [] Hearing Loss  [] Visual Disturbance  [] Tinnitus  [] Eye pain   Respiratory [] Shortness of Breath  [x] Cough  [] Snoring   Cardiovascular [] Chest Pain  [] Palpitations  [] Lightheaded   GI [] Constipation  [] Diarrhea  [] Swallowing change  [] Nausea/vomiting    [] Urinary Frequency  [] Urinary Urgency   Musculoskeletal [] Neck pain  [] Back pain  [] Muscle pain  [] Restless legs   Dermatologic [] Skin changes   Neurologic [] Memory loss/confusion  [] Seizures  [x] Trouble walking or imbalance  [] Dizziness  [] Sleep disturbance  [] Weakness  [] Numbness  [] Tremors  [x] Speech Difficulty  [] Headaches  [] Light Sensitivity  [] Sound Sensitivity   Endocrinology []Excessive thirst  []Excessive hunger   Psychiatric [] Anxiety/Depression  [] Hallucination   Allergy/immunology []Hives/environmental allergies   Hematologic/lymph [] Abnormal bleeding  [] Abnormal bruising

## 2021-09-16 NOTE — PROGRESS NOTES
tablet by mouth three times a day for 7 days if needed for muscle aches (Patient not taking: Reported on 9/16/2021)      gabapentin (NEURONTIN) 300 MG capsule Take 300 mg by mouth 3 times daily. (Patient not taking: Reported on 9/16/2021)      HYDROcodone-acetaminophen (NORCO) 5-325 MG per tablet take 1 tablet by mouth every 4 hours if needed for pain (Patient not taking: Reported on 9/16/2021)      ondansetron (ZOFRAN) 4 MG tablet take 1 tablet by mouth every 4 hours for 7 days if needed for nausea (Patient not taking: Reported on 9/16/2021)      RA SENNA 8.6 MG tablet TAKE 2 TABLETS BY MOUTH AT BEDTIME AS NEEDED FOR CONSTIPATION (Patient not taking: Reported on 9/16/2021)      traMADol (ULTRAM) 50 MG tablet take 1 tablet by mouth every 6 hours if needed for pain (Patient not taking: Reported on 9/16/2021)      predniSONE (DELTASONE) 20 MG tablet 2 bid for 3 days (Patient not taking: Reported on 9/16/2021) 12 tablet 0    pantoprazole (PROTONIX) 40 MG tablet take 1 tablet by mouth once daily (Patient not taking: Reported on 9/16/2021)      tadalafil (CIALIS) 20 MG tablet Take 20 mg by mouth daily as needed (Patient not taking: Reported on 9/16/2021)      Testosterone (ANDROGEL) 20.25 MG/1.25GM (1.62%) GEL Place 1 actuation onto the skin every morning for 30 days.  75 g 1     Current Facility-Administered Medications on File Prior to Visit   Medication Dose Route Frequency Provider Last Rate Last Admin    testosterone cypionate (DEPOTESTOTERONE CYPIONATE) injection 225 mg  225 mg IntraMUSCular Once Atul Mcfarlane MD        testosterone cypionate (DEPOTESTOTERONE CYPIONATE) injection 100 mg  100 mg IntraMUSCular Once Atul Mcfarlane MD        testosterone cypionate (DEPOTESTOTERONE CYPIONATE) injection 200 mg  200 mg IntraMUSCular Q14 Days Atul Mcfarlane MD   200 mg at 12/02/14 1555    testosterone cypionate (DEPOTESTOTERONE CYPIONATE) injection 200 mg  200 mg IntraMUSCular Q14 Days Rose Saul MD Noel   200 mg at 11/13/14 1038     Allergies: Fernando Rodriges is allergic to poison ivy extract. Past Medical History:   Diagnosis Date    Reflux esophagitis     Sleep apnea     Tremor        Past Surgical History:   Procedure Laterality Date    ABDOMINAL HERNIA REPAIR      APPENDECTOMY      BACK SURGERY      BLADDER SURGERY      ROTATOR CUFF REPAIR       Social History: Fernando Rodriges  reports that he quit smoking about 13 years ago. He smoked 0.00 packs per day for 0.00 years. He has never used smokeless tobacco. He reports current alcohol use. He reports that he does not use drugs. Family History   Problem Relation Age of Onset    High Blood Pressure Mother     High Blood Pressure Father        On exam: Blood pressure (!) 147/97, pulse 57, height 6' (1.829 m), weight 223 lb (101.2 kg). NEUROLOGIC EXAMINATION  GENERAL  Appears comfortable and in no distress; hypomimic face   HEENT  NC/ AT   NECK  Supple and no bruits heard   MENTAL STATUS:  Alert, oriented, intact memory, no confusion, hypophonic speech, normal language, no hallucination or delusion   CRANIAL NERVES: II     -      PERRLA, Fundi reveal intact venous pulsations; Visual fields intact to confrontation  III,IV,VI -  EOMs full, no afferent defect, no                      ALLI, no ptosis  V     -     Normal facial sensation  VII    -     Normal facial symmetry  VIII   -     Intact hearing  IX,X -     Symmetrical palate  XI    -     Symmetrical shoulder shrug  XII   -     Midline tongue, no atrophy    MOTOR FUNCTION:  significant for good strength of grade 5/5 in bilateral proximal and distal muscle groups of both upper and lower extremities with normal bulk, normal tone and no involuntary movements, no tremor   SENSORY FUNCTION:  Normal touch, normal pin, normal vibration, normal proprioception   CEREBELLAR FUNCTION:  He also has mild tremulousness of outstretched upper extremities, predominantly in left UE.  He has associated mild cogwheeling and bradykinesia noted. No head tremor and no chin tremor. He has a low amplitude, high frequency postural tremor of left hand. It is much more predominant with actitivity. Mild tremulousness of left hand increases at the very end of goal-directed movements such as finger-to-nose testing. Tremulousness is much more pronounced on keeping hands on wing-beating position. REFLEX FUNCTION:  Symmetric, no perverted reflex, no Babinski sign   STATION and GAIT  He was able to a stand up with support and he does have diminished arm swing on left side. Diagnostic data reviewed with the patient:   MRI brain (without) 9/10/2021: No acute infarct, no mass-effect or midline shift. No evidence of bleed. Areas of T2 flair hyperintensity in periventricular and subcortical white matter suggestive of chronic microvascular ischemic changes. Impression and Plan: Mr. Jae Rivers is a 57 y.o. male with   Presentation raising concern for early onset parkinsonism with the predominant rigid variant; discussed about different tremors; will start him on carbidopa/levodopa 25/100 tab half tab bid for 1wk, then tid  Questions about natural medicine for tremors discussed. Follow-up in 2 months      Please note that portions of this note were completed with a voice recognition program.  Although every effort was made to ensure the accuracy of this automated transcription, some errors in transcription may have occurred; occasionally words are mis-transcribed. Thank you for understanding.

## 2021-09-24 ENCOUNTER — TELEPHONE (OUTPATIENT)
Dept: PHYSICAL MEDICINE AND REHAB | Age: 61
End: 2021-09-24

## 2021-09-24 NOTE — TELEPHONE ENCOUNTER
Left message for pt that we do not have a referral yet for Dr. Alma León. Gave him our fax # and said we could talk about it on Monday as the office is closing.

## 2021-09-24 NOTE — TELEPHONE ENCOUNTER
Patient is calling to see if his referral was received in the office and would like to schedule an appointment with Dr Paez Fail to be seen. Please return his call at earliest convenience. Thank you.

## 2021-11-08 ENCOUNTER — INITIAL CONSULT (OUTPATIENT)
Dept: PHYSICAL MEDICINE AND REHAB | Age: 61
End: 2021-11-08
Payer: COMMERCIAL

## 2021-11-08 VITALS
BODY MASS INDEX: 30.81 KG/M2 | SYSTOLIC BLOOD PRESSURE: 133 MMHG | DIASTOLIC BLOOD PRESSURE: 84 MMHG | WEIGHT: 227.2 LBS | TEMPERATURE: 98.3 F | HEART RATE: 72 BPM

## 2021-11-08 DIAGNOSIS — G20 PARKINSON'S DISEASE (HCC): ICD-10-CM

## 2021-11-08 DIAGNOSIS — M48.062 SPINAL STENOSIS OF LUMBAR REGION WITH NEUROGENIC CLAUDICATION: Primary | ICD-10-CM

## 2021-11-08 PROCEDURE — 99203 OFFICE O/P NEW LOW 30 MIN: CPT | Performed by: PHYSICAL MEDICINE & REHABILITATION

## 2021-11-08 NOTE — PROGRESS NOTES
Years of education: None    Highest education level: None   Occupational History    None   Tobacco Use    Smoking status: Former Smoker     Packs/day: 0.00     Years: 0.00     Pack years: 0.00     Quit date: 2008     Years since quittin.8    Smokeless tobacco: Never Used   Vaping Use    Vaping Use: Never used   Substance and Sexual Activity    Alcohol use: Yes     Comment: socially    Drug use: No    Sexual activity: None   Other Topics Concern    None   Social History Narrative    None     Social Determinants of Health     Financial Resource Strain: Low Risk     Difficulty of Paying Living Expenses: Not hard at all   Food Insecurity: No Food Insecurity    Worried About Running Out of Food in the Last Year: Never true    Zahraa of Food in the Last Year: Never true   Transportation Needs:     Lack of Transportation (Medical): Not on file    Lack of Transportation (Non-Medical):  Not on file   Physical Activity:     Days of Exercise per Week: Not on file    Minutes of Exercise per Session: Not on file   Stress:     Feeling of Stress : Not on file   Social Connections:     Frequency of Communication with Friends and Family: Not on file    Frequency of Social Gatherings with Friends and Family: Not on file    Attends Congregation Services: Not on file    Active Member of [address] [place] [address] or Organizations: Not on file    Attends Club or Organization Meetings: Not on file    Marital Status: Not on file   Intimate Partner Violence:     Fear of Current or Ex-Partner: Not on file    Emotionally Abused: Not on file    Physically Abused: Not on file    Sexually Abused: Not on file   Housing Stability:     Unable to Pay for Housing in the Last Year: Not on file    Number of [place] in the Last Year: Not on file    Unstable Housing in the Last Year: Not on file          Current Outpatient Medications   Medication Sig Dispense Refill    pravastatin (PRAVACHOL) 20 MG tablet take 1 tablet by mouth once daily 90 tablet 0    carbidopa-levodopa (SINEMET)  MG per tablet Take half tab twice daily x 1 wk; then three times daily 90 tablet 1    famotidine (PEPCID) 20 MG tablet take 1 tablet by mouth at bedtime if needed once daily      tamsulosin (FLOMAX) 0.4 MG capsule take 1 capsule by mouth every evening      Testosterone (ANDROGEL) 20.25 MG/1.25GM (1.62%) GEL Place 1 actuation onto the skin every morning for 30 days. 75 g 1     Current Facility-Administered Medications   Medication Dose Route Frequency Provider Last Rate Last Admin    testosterone cypionate (DEPOTESTOTERONE CYPIONATE) injection 225 mg  225 mg IntraMUSCular Once Nichole Hooks MD        testosterone cypionate (DEPOTESTOTERONE CYPIONATE) injection 100 mg  100 mg IntraMUSCular Once Nichole Hooks MD        testosterone cypionate (DEPOTESTOTERONE CYPIONATE) injection 200 mg  200 mg IntraMUSCular Q14 Days Nichole Hooks MD   200 mg at 12/02/14 1555    testosterone cypionate (DEPOTESTOTERONE CYPIONATE) injection 200 mg  200 mg IntraMUSCular Q14 Days Nichole Hooks MD   200 mg at 11/13/14 1038     Allergies   Allergen Reactions    Poison Ivy Extract     No Known Allergies      medications       Subjective:     Review of Systems  Constitutional: Negative for fever, chills and unexpected weight change. HEENT: Negative for trouble swallowing. No acute vision changes. Respiratory: Negative for cough and shortness of breath. Cardiovascular: Negative for chest pain. Endocrine: Negative for polyuria. Genitourinary: Negative for dysuria, urgency,frequency and difficulty urinating. Musculoskeletal: Positive for stiff joints. Neurological: Negative for headaches. Dermatologic: Negative rashes, ulcers, or lesions. Psychiatric: Negative for depressed mood or anxiety.       Objective:     Physical Exam  /84   Pulse 72   Temp 98.3 °F (36.8 °C)   Wt 227 lb 3.2 oz (103.1 kg)   BMI 30.81 kg/m²   Constitutional: He is in no distress. He appears well-developed and well-nourished. HEENT:  Normocephalic. EOMI. PERRL. Mucous membranes pink and moist.   Pulmonary/Chest: Respirations WNL and unlabored. Skin: Skin is warm, dry and intact with good turgor. Psychiatric: He has a normal mood and affect. His behavior is normal.Thought content normal.   MSK: No palpable muscle spasm or trigger points B lumbar paraspinal muscles. Functional ROM lumbar flexion, extension, lateral rotation and bending. Strength 5/5 B hip flexion, knee extension, plantar and dorsiflexion. Gait: Short stride  Neurological: He is alert and oriented to person, place, and time. He has DTRs 2+ B patella. . Sensation intact to light touch. Cogwheel rigidity noted R wrist with distraction rapid finger flexion/extension of L hand. EXTREMITIES: No edema. No calf tenderness to palpation bilaterally. Nursing note and vitals reviewed. Diagnostic Studies:      MRI LUMBAR SPINE 6/21/21  FINDINGS:   BONES/ALIGNMENT: There are 5 non-rib-bearing, lumbar type vertebral bodies. There is grade 1 anterolisthesis at L4-5.  Vertebral body heights are   maintained.  There is no aggressive marrow signal abnormality.       SPINAL CORD:  The conus terminates at L1.  No nerve root thickening in the   cauda equina.       SOFT TISSUES: No abnormal enhancement is seen of the lumbar spine.  No   paraspinal mass identified.       L1-L2: No significant spinal canal stenosis.  Facet DJD with mild bilateral   neural foraminal stenosis.       L2-L3: Disc protrusion and facet DJD without significant spinal canal   stenosis.  There is mild narrowing of the right lateral recess.  Mild left   neural foraminal stenosis.       L3-L4: Disc protrusion, facet DJD, and ligamentum flavum hypertrophy with   mild spinal canal stenosis.  There is narrowing of the left lateral recess.    Moderate bilateral neural foraminal stenosis.       L4-L5: Disc protrusion, facet DJD, and ligamentum flavum hypertrophy with   moderate spinal canal stenosis and narrowing of both lateral recesses. Severe left and moderate to severe right neural foraminal stenosis.       L5-S1: Facet DJD without significant spinal canal stenosis.  Severe bilateral   neural foraminal stenosis.         Impression   1. Multilevel degenerative changes of the lumbar spine with moderate spinal   canal stenosis at L4-5 and mild spinal canal stenosis at L3-4.   2. Multilevel lateral recess and neural foraminal stenosis as above. 3. Grade 1 anterolisthesis at L4-5. MRI BRAIN 9/10/21  FINDINGS:   INTRACRANIAL STRUCTURES/VENTRICLES: There is no acute infarct. No mass effect   or midline shift. No evidence of an acute intracranial hemorrhage.  Areas of   T2 FLAIR hyperintensity are seen in the periventricular and subcortical white   matter, which are nonspecific, but may represent chronic microvascular   ischemic change. [place] is prominence of the ventricles and sulci due to   global parenchymal volume loss.   The sellar/suprasellar regions appear   unremarkable.  The normal signal voids within the major intracranial vessels   appear maintained.       ORBITS: The visualized portion of the orbits demonstrate no acute abnormality.       SINUSES: Scattered mucosal thickening of the paranasal sinuses.  The mastoid   air cells demonstrate no acute abnormality.       BONES/SOFT TISSUES: The bone marrow signal intensity appears normal. The soft   tissues demonstrate no acute abnormality.           Impression   1. No acute intracranial abnormality.  No acute infarct. 2. Mild global parenchymal volume loss with chronic microvascular ischemic   changes. 3. Scattered mucosal thickening of the paranasal sinuses. Assessment:       Diagnosis Orders   1. Spinal stenosis of lumbar region with neurogenic claudication  [facility] [place]   2.  Parkinson's disease [facility] [place]          Plan:      Pain is currently controlled after JESSICA. Parkinson's symptoms improved on low dose Sinemet    Patient will benefit from physical therapy with a neuro rehab specialist to focus on long-term management of back pain with core strengthening and mobility techniques for his newly diagnosed Parkinson's Disease. Orders Placed This Encounter   Procedures    [facility]     Referral Priority:   Routine     Referral Type:   Eval and Treat     Referral Reason:   Specialty Services Required     Requested Specialty:   Physical Therapy     Number of Visits Requested:   1     No orders of the defined types were placed in this encounter. Return in about 8 weeks (around 1/3/2022). Electronically signed by Guilherme James MD on 11/8/2021 at 10:48 PM.     Please note that this chart was generated using voicerecognition Dragon dictation software. Although every effort was made to ensurethe accuracy of this automated transcription, some errors in transcription may haveoccurred.

## 2021-11-17 ENCOUNTER — HOSPITAL ENCOUNTER (OUTPATIENT)
Dept: PHYSICAL THERAPY | Facility: CLINIC | Age: 61
Discharge: HOME | End: 2021-11-17

## 2021-11-17 PROCEDURE — 9900000066 HC EVALUATION (SELF-PAY)

## 2021-11-17 NOTE — CONSULTS
[] [facility] [address]  [address]    [phone]  F: [phone] [x] [address]  [facility] [address]   [address]  P: [phone]  F: [phone] [] [address]  [address] [facility] [address]  P: [phone]  F: [phone] [] [address]  [facility]   [address]   [place]: [phone]  F: [phone] [] [facility]  [address] [facility] [address]  [place]: [phone]   F: [phone]        Physical Therapy Neurological Evaluation    Date:  2021  Patient: Alla Morillo  : 1960  MRN: [id number]  Physician: Dr. Desiree Ceron MD   Insurance: RETAIL  Medical Diagnosis: Spinal stenosis of lumbar region with neurogenic claudication; Parkinson's Disease  Rehab Codes: R26.89, M61.81,   Next 's appt.: 21  Date of symptom onset: 21    Subjective:   CC: Pt arrives for physical therapy evaluation of impairments related to new Parkinson's disease diagnosis. Notes his friends told him they were concerned about his potential symptoms and wanted him to get checked out ~2 mo ago - notes he's had some issues with his gait, balance, and some fine motor difficulty. LUE more difficulty - does note tremors in this hand, more so with activity/use of hand. RUE finger tremors as well. Is on Sinemet as of 21 - noting he's now able to snap fingers, but notes he's \"still just trying to notice things, since I've only been at this Parkinson's thing for two months. \"     Pertinent medical hx: Chronic low back pain of ~1 Lumbar lateral decompression surgery 2020; to resolve LLE drop foot - did improve for 6 mo.  Hx of abdominal hernia repair in  - getting abdominal CT scan d/t posterolateral wall pain that radiates into anterior abdominal. Pain         Impression   1. No acute intracranial abnormality.  No acute infarct. 2. Mild global parenchymal volume loss with chronic microvascular ischemic   changes. 3. Scattered mucosal thickening of the paranasal sinuses. [] Other:    Medications: [x] Refer to full medical record [] None [] Other:  Allergies:       [x] Refer to full medical record [] None [] Other:    Function:  Hand Dominance  [x] Right  [] Left  Patient lives with:  Wife   In what type of home []  One story   [x] Two story   [] Split level   Number of stairs to enter 5    With handrail on the []  Right to enter   [x] Left to enter   Bathroom has a []  Tub only  [x] Tub/shower combo   [] Walk in shower    []  Grab bars   Washing machine is on []  Main level   [] Second level   [] Basement   Employer N/A   Job Status [x]  Retired     Work activities/duties        [address]:  Current DME available: None      ADL/IADL Previous level of function Current level of function Who currently assists the patient with task   Bathing  [x] Independent  [] Assist [x] Independent  [] Assist    Dress/grooming [x] Independent  [] Assist [x] Independent  [] Assist    Transfer/mobility [x] Independent  [] Assist [x] Independent  [] Assist    Feeding [x] Independent  [] Assist [x] Independent  [] Assist    Toileting [x] Independent  [] Assist [x] Independent  [] Assist    Driving [x] Independent  [] Assist [x] Independent  [] Assist    Housekeeping [x] Independent  [] Assist [x] Independent  [] Assist    Grocery shop/meal prep [x] Independent  [] Assist [x] Independent  [] Assist      Gait Prior level of function Current level of function    [x] Independent  [] Assist [x] Independent  [] Assist   Device: [x] Independent [x] Independent    [] Straight Cane [] Quad cane [] Straight Cane [] Quad cane    [] Standard walker [] Rolling walker   [] 4 wheeled walker [] Standard walker [] Rolling walker   [] 4 wheeled walker    [] Wheelchair [] Wheelchair       Pain:  [x] Yes  [] No Location: left low back  Pain Rating: (0-10 scale) 2-3/10  Pain altered Tx:  [] Yes  [x] No  Action:    Symptoms:  [] Improving [] Worsening [x] Same  Better:  Asper cream  Worse: heavier lifting  Sleep: [x] OK    [] Disturbed           Objective:    POSTURE No deficit Deficit Not Tested Comments   Kyphosis [] [x] []    Scoliosis [x] [] []    Forward Head [] [x] []    Rounded Shldrs [] [x] []    Slumped Sitting [x] [] []    Skin Integrity [x] [] []           NEUROLOGICAL       Reflexes [x] [] [] [] 0: absent  [x] 1+: diminished - B patellar  [] 2+: brisk, normal  [] 3+: brisker than average, slight hyperactive  [] 4+: very brisk, hyperactive, clonus    Special reflexes:   Babinski:   [] negative [] positive  Hoffmans: [x]negative  [] positive   Cranial Nerves [x] [] []    Sensation [x] [] []    Bladder/Bowel [] [x] [] Doctor aware - does note constipation d/t medications and this does worsen back pain, relieved with bowel movement.  CT of abdomen soon, being followed   Coordination [] [x] []                                  Present         Absent                                         UE/LE           UE/LE  Dysdiadochokinesia:      [x] []            [] [x]  Dysmetria:                     [] []            [x] [x]   Tone [] [x] [] [] 0: no increase in muscle tone  [x] 1: slight increase in muscle tone, manifested by a catch and release or by minimal resistance at end ROM - biceps/triceps bilat, L hamstrings  [] 1+: slight increase in muscle tone, manifested by a catch and release or by minimal resistance throughout remainder (less than half) of ROM  [] 2: more marked increase in muscle tone throughout most of ROM, but affected part easily moved  [] 3: considerable increase in tone, passive movement difficult  [] 4:  rigid in flexion or extension   Clonus [x] [] []    Tremors [] [x] [] BUE (R>L)   Comments:         ROM  °A/P STRENGTH    Left Right Left Right   Shoulder Flex WNL WNL 5 5   Abd   5 5   Elbow Flex WNL WNL 5 5   Ext   5 5   Wrist Flex       Ext       Hand    4+ 4+   Hip Flex WNL WNL 5 5   Ext   4+ 4+   Abd   4+ 4+   Knee Flex WNL WNL 5 5   Ext   5 5   Ankle DF 0 -2 5 5   PF   4 4                    FUNCTION INDEP MIN ASSIST MOD ASSIST MAX ASSIST NOT TESTED   Bed Mobility        -rolling [x] [] [] [] []   -sit to supine [x] [] [] [] []   -supine to sit [x] [] [] [] []   Transfers        -sit to stand [x] [] [] [] []   -sliding board [] [] [] [] [x]   -pivot [] [] [] [] [x]   Balance        -sitting static [x] [] [] [] []   -dynamic [x] [] [] [] []   -standing static [x] [] [] [] []   -dynamic [x] [] [] [] []   Ambulation [x] [] [] [] []   Distance/Device: 100 ft, no device   Analysis: Lack of armswing LUE>RUE, lack of trunk rotation, mild fwd flexed head/shoulders throughout gait, decreased step length with reduced RLE stance time, reduced heel strike bilat with foot drop noted LLE, mild reduction in gait speed     W/C Mobility [] [] [] [] [x]   Groom/Dress [] [] [] [] [x]     Core set neurological outcome measures: [x] MiniBEST: COMPLETE AT NEXT SESSION  [x] 10mWT: 9.62s self selected; 6.41s fast  Gait speed: 1.04 m/s self selected(community ambulator), 1.56 m/s fast        [] FGA:  [x] 5x STS: 10.95s (<12s)  [] 6MWT:       Activity-Specific Balance   Confidence Scale  Score: 72% self confidence in balance tasks     Comments:    Assessment: Ping Lloyd is a 58 yo male who presents with higher-level gait, balance, and postural deficits with BUE tremor (L>R), consistent with new diagnosis of Parkinson's disease. Pt will benefit from skilled physical therapy to address B ankle/hamstring ROM, increase hip strength from mild deficit, postural focus, improve arm swing/heel strike and other gait mechaincs to improve efficiency and safety, and progress gait negotiation of obstacles/dual tasking to improve community safety and increase community participation.      Problems:    [x] ? Pain:  [x] ? ROM:  [x] ? Strength:  [x] ? Function:  [x] Other: balance/gait impairment       STG: (to be met in 6 treatments)  1. ? Pain: No more than 2/10 max pain in low back after therapy  2. ? ROM: At least 5deg DF PROM bilat to indicate improved ankle mobility for improved gait/stair mechanics at ankle   3. ? Balance: At least 4 point improvement on MiniBEST initial assessment score to indicate progression towards significant improvement across balance systems. 4. ? Strength:   a. At least 4+/5 B ankle PF to allow improved push off with gait to increase efficiency, reduce progression towards festinating gait  b. At least 5-/5 B hip ext/abd to allow further improved pelvic stability in single limb stance for gait/stair climbing  5. ? Function:   a. Able to ambulate at 1.1m/s with improved B armswing and trunk rotation, and increasing step length and heel strike to indicate improved gait mechanics  b. Able to negotiate head turns, step overs in gait without more than minimal balance treatment/slowed speed  6. Patient to be independent with home exercise program as demonstrated by performance with correct form without cues. 7. Demonstrate Knowledge of fall prevention    LTG: (to be met in 12 treatments)  1. Ability to achieve neutral thoracic/cervical posture without cueing from therapist throughout session  2. At least 5-/5 B ankle PF strength to allow further improved gait mechanics/efficiency, reduce tendency for progression to festinating gait  3. At least 6-point improvement on MiniBEST from initial assessment to indicate clinically significant improvement across balance systems. 4. Able to ambulate at 1.2 m/s with appropriate B armswing and trunk rotation, and increasing step length and consistent heel strike - all without cuing from therapist - to indicate improved gait mechanics  5.  Able to negotiate obstacles in gait path including step overs, compliant surfaces without slowed gait speed, good balance throughout      Patient goals: \"balance, know what I need to do\"    Rehab Potential:  [x] Good  [] Fair  [] Poor   Suggested Professional Referral:  [x] No  [] Yes:  Barriers to Goal Achievement[de-identified]  [x] No  [] Yes:  Domestic Concerns:  [x] No  [] Yes:    Pt. Education:  [x] Plans/Goals, Risks/Benefits discussed  [] Home exercise program  Method of Education: [x] Verbal  [] Demo  [] Written  Comprehension of Education:  [x] Verbalizes understanding. [] Demonstrates understanding. [] Needs Review. [] Demonstrates/verbalizes understanding of HEP/Ed previously given. Treatment Plan:  [x] Therapeutic Exercise   64124  [] Iontophoresis: 4 mg/mL Dexamethasone Sodium Phosphate  mAmin  49425   [x] Therapeutic Activity  08476 [] Vasopneumatic cold with compression  82027    [x] Gait Training   14554 [] Ultrasound   09162   [x] Neuromuscular Re-education  70243 [] Electrical Stimulation Unattended  89341   [x] Manual Therapy  25290 [] Electrical Stimulation Attended  07138   [x] Instruction in HEP  [] Dry Needling   [] Aquatic Therapy   36527 [x] Cold/hotpack    [] Massage   74965      [] Lumbar/Cervical Traction  58857     []  Medication allergies reviewed for use of    Dexamethasone Sodium Phosphate 4mg/ml     with iontophoresis treatments. Pt is not allergic.       Frequency: 2 x/weeks for 12 visits    Todays Treatment:  Modalities:   Exercises:  Exercise Reps/ Time Weight/ Level Comments                                 Other: HELD treatment this date d/t time needed in thorough neurological evaluation    Specific Instructions for next treatment:   MiniBEST exam, gait obstacle training, treadmill training w/ armswing, circuit training BLEs/arm swing/trunk rotation, gastroc stretch/strengthening    Evaluation Complexity:  History (Personal factors, comorbidities) [] 0 [] 1-2 [x] 3+   Exam (limitations, restrictions) [] 1-2 [] 3 [x] 4+   Clinical presentation (progression) [] Stable [x] Evolving  [] Unstable   Decision Making [] Low [x] Moderate [] High    [] Low Complexity [x] Moderate Complexity [] High Complexity       Treatment Charges: Mins Units   [x] Evaluation       []  Low       [x]  Moderate       []  High 50 1   []  Modalities     []  Ther Exercise     []  Manual Therapy     []  Ther Activities     []  Aquatics     []  Vasocompression     []  Other       TOTAL TREATMENT TIME: 50 min    Time in:10:10 am      Time out: 11:00 am    Electronically signed by: Patito Jones PT        Physician Signature:________________________________Date:__________________  By signing above or cosigning this note, I have reviewed this plan of care and certify a need for medically necessary rehabilitation services.      *PLEASE SIGN ABOVE AND FAX BACK ALL PAGES*

## 2021-11-18 ENCOUNTER — VIRTUAL VISIT (OUTPATIENT)
Dept: NEUROLOGY | Age: 61
End: 2021-11-18
Payer: COMMERCIAL

## 2021-11-18 DIAGNOSIS — R25.1 TREMOR OF LEFT HAND: ICD-10-CM

## 2021-11-18 DIAGNOSIS — G20 PARKINSON'S DISEASE (HCC): Primary | ICD-10-CM

## 2021-11-18 DIAGNOSIS — G44.219 EPISODIC TENSION-TYPE HEADACHE, NOT INTRACTABLE: ICD-10-CM

## 2021-11-18 PROCEDURE — 99214 OFFICE O/P EST MOD 30 MIN: CPT | Performed by: PSYCHIATRY & NEUROLOGY

## 2021-11-18 RX ORDER — BUTALBITAL, ACETAMINOPHEN AND CAFFEINE 50; 325; 40 MG/1; MG/1; MG/1
TABLET ORAL
Qty: 40 TABLET | Refills: 0 | Status: SHIPPED | OUTPATIENT
Start: 2021-11-18

## 2021-11-18 NOTE — PROGRESS NOTES
NEUROLOGY FOLLOW-UP VISIT   Patient Name:       Manuel Bakre  :        1960  Clinic Visit Date:    2021        Dear Dr. Eun Stout MD       I saw Mr. Manuel Baker in follow-up visit today in continuation of neurologic care. As you know he  is a 57 y.o. right handed  male initially seen in neurology consultation on 2021 for eval of possible parkinsonism. Today is the first follow-up visit. Since the initial visit; he has been on Sinemet smaller dose with gradual dose escalation and responded well to it. Denied any adverse effects. He also c/o intermittent tension type bifrontal headaches without associated photophobia/phonophobia/nausea/vomiting. At times advil would not relieve the pain. During the initial visit on 2021; he stated that he was having tremors in his hands along with low volume speech as noted by his friends. He was also having problems with gait. He does not think himself that he is having any of these problems but his friends noticed him having shaking episodes. He does not have any alteration of mentation with those episodes. Admits to having changes in speech. He does have occasional twitching of right face. Also admits to occasional drooling on right side. He denies having tremors occurring at rest but also has subjective sensation of tremulousness. Rarely his tremulousness can get more pronounced with stress. He denies increased tremors on exertion. Denies spilling food stuff onto the clothes. His problems started about 1 and half years ago and it is of gradual onset. Admits to getting slow in his ADLs. Admits to having sleep difficulties and was diagnosed with sleep apnea and has been using nightly CPAP. Denies bad dreams. Denies family history of tremors. Denies history of head injuries. All items selected indicate a positive finding. Those items not selected are negative.   Constitutional [] Weight loss/gain   [] Fatigue  [] Fever/Chills   HEENT [] Hearing Loss  [] Visual Disturbance  [] Tinnitus  [] Eye pain   Respiratory [] Shortness of Breath  [] Cough  [] Snoring   Cardiovascular [] Chest Pain  [] Palpitations  [] Lightheaded   GI [] Constipation  [] Diarrhea  [] Swallowing change    [] Urinary Frequency  [] Urinary Urgency   Musculoskeletal [] Neck pain  [] Back pain  [] Muscle pain  [] Restless legs   Dermatologic [] Skin changes   Neurologic [] Memory loss/confusion  [] Seizures  [] Trouble walking or imbalance  [] Dizziness  [] Weakness  [] Numbness  [x] Tremors  [] Speech Difficulty  [x] Headaches  [] Light Sensitivity  [] Sound Sensitivity   Endocrinology []Excessive thirst  []Excessive hunger   Psychiatric [] Anxiety/Depression  [] Hallucination   Allergy/immunology []Hives/environmental allergies   Hematologic/lymph [] Abnormal bleeding  [] Abnormal bruising       Current Outpatient Medications on File Prior to Visit   Medication Sig Dispense Refill    pravastatin (PRAVACHOL) 20 MG tablet take 1 tablet by mouth once daily 90 tablet 0    carbidopa-levodopa (SINEMET)  MG per tablet Take half tab twice daily x 1 wk; then three times daily 90 tablet 1    famotidine (PEPCID) 20 MG tablet take 1 tablet by mouth at bedtime if needed once daily      tamsulosin (FLOMAX) 0.4 MG capsule take 1 capsule by mouth every evening      Testosterone (ANDROGEL) 20.25 MG/1.25GM (1.62%) GEL Place 1 actuation onto the skin every morning for 30 days.  75 g 1     Current Facility-Administered Medications on File Prior to Visit   Medication Dose Route Frequency Provider Last Rate Last Admin    testosterone cypionate (DEPOTESTOTERONE CYPIONATE) injection 225 mg  225 mg IntraMUSCular Once Levern MD Nasrin        testosterone cypionate (DEPOTESTOTERONE CYPIONATE) injection 100 mg  100 mg IntraMUSCular Once Levern MD Nasrin        testosterone cypionate (DEPOTESTOTERONE CYPIONATE) injection 200 mg  200 mg IntraMUSCular Q14 Days Suzette Real MD   200 mg at 12/02/14 1555    testosterone cypionate (DEPOTESTOTERONE CYPIONATE) injection 200 mg  200 mg IntraMUSCular Q14 Days Suzette Real MD   200 mg at 11/13/14 1038     Allergies: Eric Shine is allergic to poison ivy extract and no known allergies. Past Medical History:   Diagnosis Date    Back pain     Reflux esophagitis     Sleep apnea     Tremor        Past Surgical History:   Procedure Laterality Date    ABDOMINAL HERNIA REPAIR      APPENDECTOMY      BACK SURGERY      BLADDER SURGERY      ROTATOR CUFF REPAIR       Social History: Eric Shine  reports that he quit smoking about 13 years ago. He smoked 0.00 packs per day for 0.00 years. He has never used smokeless tobacco. He reports current alcohol use. He reports that he does not use drugs. Family History   Problem Relation Age of Onset    High Blood Pressure Mother     Stroke Mother     High Blood Pressure Father      On exam; Vitals: Wt 227 Lb; Ht 6'      NEUROLOGIC EXAMINATION  GENERAL  Appears comfortable and in no distress   HEENT  NC/ AT   NECK  Supple and no bruits heard   MENTAL STATUS:  Alert, oriented x3, intact memory, no confusion, normal speech, normal language, no hallucination or delusion   CRANIAL NERVES: II     -      PERRLA  III,IV,VI -  EOMs full, no ptosis  V     -     unable to perform  VII    -     Normal facial symmetry  VIII   -     Intact hearing  IX,X -     unable to perform  XI    -     Symmetrical shoulder shrug  XII   -     Midline tongue, no atrophy    MOTOR FUNCTION:  significant for no visible weakness in both upper and lower extremities with normal bulk and Mild tremulousness of outstretched upper extremities, predominantly in Lt UE. No head tremor and no chin tremor.     SENSORY FUNCTION:  Unable to perform   CEREBELLAR FUNCTION:  Intact fine motor control over upper limbs   REFLEX FUNCTION:  Unable to perform   STATION and GAIT  Normal station, diminished arm swing on left side; o/w normal gait         Diagnostic data reviewed with the patient:   MRI brain (without) 9/10/2021: No acute infarct, no mass-effect or midline shift. No evidence of bleed. Areas of T2 flair hyperintensity in periventricular and subcortical white matter suggestive of chronic microvascular ischemic changes. Impression and Plan: Mr. Delroy Schneider is a 57 y.o. male with   Early onset rigid variant parkinsonism; responded well to carbidopa/levodopa 25/100 tid; to continue the same. Has had big and loud PT eval; advised to continue PT recommnedations for parkinsonism. Episodic tension type headache; at times refractory to OTC nsaids; to take butalbital prn qod   Follow up in 3-4 months. This is a telehealth visit that was performed with the originating site at Patient Location: Patient Home and Provider Location of [place], [place]. Patient ID verified by me prior to start of this visit. Verbal consent to participate in video visit was obtained. Complete and detailed physical examination is not feasible during this virtual video visit and patient is agreeable and understood. Due to this being a TeleHealth encounter (During [id number]-61 public health emergency),   evaluation of the following organ systems was limited:     vitals /Constitutional /EENT/ Resp/ CV/ GI/ / MS/Neuro/Skin/Heme-Lymph-Imm. Pursuant to the emergency declaration under the [facility], [address] [place] [address] authority and the [employer] Act, this Virtual Visit was conducted with patient's (and/or legal guardian's) consent, to reduce the patient's risk of exposure to COVID-19 and provide necessary medical care. The patient (and/or legal guardian) has also been advised to contact this office for worsening conditions or problems, and seek emergency medical treatment and/or call 911 if deemed necessary.   Services were provided through a video synchronous discussion virtually to substitute for in-person clinic visit. I discussed with the patient the nature of our telehealth visits via interactive/real-time audio/video that:  - I would evaluate the patient and recommend diagnostics and treatments based on my assessment  - Our sessions are not being recorded and that personal health information is protected  - Our team would provide follow up care in person if/when the patient needs it.

## 2021-12-01 ENCOUNTER — HOSPITAL ENCOUNTER (OUTPATIENT)
Dept: PHYSICAL THERAPY | Facility: CLINIC | Age: 61
Discharge: HOME | End: 2021-12-01

## 2021-12-01 PROCEDURE — 9900000072 HC NEUROMUSCULAR RE-EDUCATION (SELF-PAY)

## 2021-12-01 NOTE — FLOWSHEET NOTE
[] [facility]) - [facility] CENTER &  Therapy  [address]  [phone]  F: [phone] [] [address]  [address]   [address]  P: [phone]  F: [phone] [] [place]  [address]  P: [phone]  F: [phone] [] [address]  P: [phone]  F: [phone] [] [address]  [facility]   [address]   [place]: [phone]  F: [phone]      Physical Therapy Daily Treatment Note    Date:  2021  Patient Name:  Jose E Staples    :  1960  MRN: [id number]  Physician: Dr. Isabella Ash MD                                    Insurance: RETAIL  Medical Diagnosis: Spinal stenosis of lumbar region with neurogenic claudication; Parkinson's Disease  Rehab Codes: R26.89, M61.81,   Next 's appt.: 21  Date of symptom onset: 21  Visit# / total visits: 2/12     Cancels/No Shows: 0    Subjective:    Pain:  [x] Yes  [] No Location: L hip/low back N/A Pain Rating: (0-10 scale) \"mild\"/10  Pain altered Tx:  [] No  [] Yes  Action:  Comments: Pt arrives 8 min late, notes he has mild L hip/low back pain.     Objective:    on MiniBEST      Modalities:   Precautions:  Exercises:  Exercise Reps/ Time Weight/ Level Comments   MiniBEST testing x  See additional flowsheet for specifics         Balance      Fwd/retro sway, typical MAXINE 10x  easy   Fwd/retro sway, NBOS 10x  NBOS - narrow base of support   Lateral lean into stepping strategies 8 min total  Both sides, progressing to no count down and increased lean   Tandem amb w/ verbal fluency 5x30 ft  \"as many things as you can say that begin with the letter 'a' \"  - slow completion d/t difficulty   Tandem stance w/ head turns 3x10 ea  Slow completion d/t difficulty Gait      BIG arm swing and steps  600 ft                       HIIT training   ADD NEXT   Circuit 1   1) BLE circuit   Circuit 2   1) Postural circuit w/ big movements                           Other:    MiniBEST exam, gait obstacle training, treadmill training w/ armswing, circuit training BLEs/arm swing/trunk rotation, gastroc stretch/strengthening      Treatment Charges: Mins Units   []  Modalities     []  Ther Exercise     []  Manual Therapy     []  Ther Activities     []  Aquatics     []  Vasocompression     [x]  Other: neuro 51 4   [x]  Other: gait 6 --   Total Treatment time 57 4   RETIAL CHARGES    Assessment: [x] Progressing toward goals. Spent significant time in assessment of balance systems through MiniBEST test today - overall demos good balance with difficulty noted with taking a rapid singular reactive step, single leg/tandem stance balance, and maintaining appropriate gait mechanics/balance with head turns. Worked on these throughout session, as well as emphasis on BIG armswing/stepping with gait d/t tendency for reduced step length and forefoot strike intermittently. Pt with good understanding of exercises, provided written HEP (see below in education for specifics). Difficulty with verbal fluency during balance task, demonstrating more cost to cognitive task and slower balance task performance. [] No change. [] Other:    [x] Bret Boykin is a 56 yo male who presents with higher-level gait, balance, and postural deficits with BUE tremor (L>R), consistent with new diagnosis of Parkinson's disease.  Pt will benefit from skilled physical therapy to address B ankle/hamstring ROM, increase hip strength from mild deficit, postural focus, improve arm swing/heel strike and other gait mechaincs to improve efficiency and safety, and progress gait negotiation of obstacles/dual tasking to improve community safety and increase community participation.        STG: (to be met in 6 treatments)  1. ? Pain: No more than 2/10 max pain in low back after therapy  2. ? ROM: At least 5deg DF PROM bilat to indicate improved ankle mobility for improved gait/stair mechanics at ankle   3. ? Balance: At least 4 point improvement on MiniBEST initial assessment score to indicate progression towards significant improvement across balance systems. 4. ? Strength:   a. At least 4+/5 B ankle PF to allow improved push off with gait to increase efficiency, reduce progression towards festinating gait  b. At least 5-/5 B hip ext/abd to allow further improved pelvic stability in single limb stance for gait/stair climbing  5. ? Function:   a. Able to ambulate at 1.1m/s with improved B armswing and trunk rotation, and increasing step length and heel strike to indicate improved gait mechanics  b. Able to negotiate head turns, step overs in gait without more than minimal balance treatment/slowed speed  6. Patient to be independent with home exercise program as demonstrated by performance with correct form without cues. 7. Demonstrate Knowledge of fall prevention     LTG: (to be met in 12 treatments)  1. Ability to achieve neutral thoracic/cervical posture without cueing from therapist throughout session  2. At least 5-/5 B ankle PF strength to allow further improved gait mechanics/efficiency, reduce tendency for progression to festinating gait  3. At least 6-point improvement on MiniBEST from initial assessment to indicate clinically significant improvement across balance systems. 4. Able to ambulate at 1.2 m/s with appropriate B armswing and trunk rotation, and increasing step length and consistent heel strike - all without cuing from therapist - to indicate improved gait mechanics  5. Able to negotiate obstacles in gait path including step overs, compliant surfaces without slowed gait speed, good balance throughout        Patient goals: \"balance, know what I need to do\"    Pt.  Education:  [x] Yes  [] No  [] Reviewed Prior HEP/Ed  Method of Education: [x] Verbal  [] Demo  [] Written  Comprehension of Education:  [x] Verbalizes understanding. [] Demonstrates understanding. [] Needs review. [] Demonstrates/verbalizes HEP/Ed previously given. Plan: [x] Continue current frequency toward long and short term goals.     [x] Specific Instructions for subsequent treatments: postural/BLE circuits, further balance training within gait, on compliant surfaces      Time In: 9:08 am            Time Out: 10:05 am    Electronically signed by:  Perla Pizarro, PT

## 2021-12-01 NOTE — FLOWSHEET NOTE
step, OR would fall if not caught, OR falls spontaneously    5. COMPENSATORY STEPPING CORRECTION - BACKWARD  Instruction: \"Stand with your feet shoulder width apart, arms at your sides. Lean backward against my hands beyond your backward limits. When I let go, do whatever is necessary, including taking a step, to avoid a fall. \"  (2) Normal: Recovers independently with a single, large step (second realignment step is allowed)  (1) Moderate: More than one step used to recover equilibrium.  (0) Severe: No step, OR would fall if not caught, OR falls spontaneously    6. COMPENSATORY STEPPING CORRECTION - LATERAL  Instruction: \"Stand with your feet together, arms down at your sides. Lean into my hand beyond your sideways limit. When I let go, do whatever is necessary, including taking a step, to avoid a fall\". Left        Right  (2) Normal: Recovers independently with 1 step   (2) Normal: Recovers independently with 1 step   (1) Moderate: Several Steps to recover equilibrium  (1) Moderate: Several Steps to recover equilibrium  (0) Severe: Falls, or cannot step    (0) Severe: Falls, or cannot step  Use the side with the lowest score to calculate sub-score and total score. Sensory Orientation    SUBSCORE: 6/6  7. STANCE (FEET TOGETHER); EYES OPEN, FIRM SURFACE  Instruction: \"Place your hands on your hips. Place your feet together until almost touching. Look straight ahead. Be as stable and still as possible, until I say stop. \"  Time in seconds: 30s  (2) Normal: 30s  (1) Moderate: <30s  (0) Severe: Unable    8. STANCE (FEET TOGETHER; EYES CLOSED, FOAM SURFACE  Instruction: \"Step onto the foam. Place your hands on your hips. Place your feet together until almost touching. Be as stable and as still as possible, until I say stop. I will start timing when you close your eyes\". Time in seconds: 30s  (2) Normal: 30s  (1) Moderate: <30s  (0) Severe: Unable    9.    INCLINE - EYES CLOSED  Instruction: \"Step onto the incline ramp. Please stand on the incline ramp with your toes toward the top. Place your feet shoulder width apart and have your arms down at your sides. I will start timing when you close your eyes. Time in seconds: 30s  (2) Normal: Stands independently 30s and aligns with gravity  (1) Moderate: Stands independently <30s OR aligns with surface  (0) Severe: Unable        Dynamic Gait     SUBSCORE: 8/10  10. CHANGE IN GAIT SPEED  Instruction: \"Begin walking at your normal speed, when I tell you \"fast\", walk as fast as you can. When I say \"slow\", walk very slowly\". (2) Normal: Significantly changes walking speed without imbalance. (1) Moderate: Unable to change walking speed or signs of imbalance  (0) Severe: Unable to achieve significant change in walking speed AND signs of imbalance    11. WALK WITH HEAD TURNS - HORIZONTAL  Instruction: \"Begin walking at your normal speed, when I say \"right\", turn your head and look to the right. When I say \"left\", turn your head and look to the left. Try to keep yourself walking in a straight line. \"  (2) Normal: Performs head turns with no change in gait speed and good balance. (1) Moderate: Performs head turns with reduction in gait speed. (0) Severe: Performs head turns with imbalance. 12. WALK WITH PIVOT TURNS  Instruction: \"Begin walking at your normal speed. When I tell you to 'turn and stop', turn as quickly as you can, face the opposite direction, and stop. After the turn, your feet should be close together. (2) Normal: Turns with feet close FAST (<3 steps) with good balance. (1) Moderate: Turns with feet close SLOW (> or = 4 steps) with good balance. (0) Severe: Cannot turn with feet close at any speed without imbalance. 13. STEP OVER OBSTACLES  Instruction: \"Begin walking at your normal speed. When you get to the box, step over it, not around it and keep walking. \"  (2) Normal: Able to step over box with minimal change of gait speed and with good balance. (1) Moderate: Steps over box but touches box OR displays cautious behavior by slowing gait. (0) Severe: Unable to step over box OR steps around box    14. TIMED UP & GO WITH DUAL TASK (3 METER WALK)  Instruction TUG: \"When I say \"Go\", stand up from the chair, walk at your normal speed across the tape on the floor, turn around, and come back to sit in the chair. \"  Instruction TUG with Dual Task: \"Count backwards by threes starting at ____. When I say \"Go\", stand up from the chair, walk your normal speed across the tape on the floor, turn around, and come back to sit in the chair. Continue counting backwards the entire time. TU.31 seconds;  Dual Task TUG: 10.18 seconds  (2) Normal: No noticeable change in sitting, standing, or walking while backward counting when compared to TUG without dual Task. (1) Moderate: Dual task affects either counting OR walking (>10%) when compared to the TUG without Dual Task  (0) Severe: Stops counting while walking OR stops walking while counting  When scoring item 14, if subject's gait speed slows more than 10% between the TUG without and with a Dual Task the score should be decreased by a point.        TOTAL SCORE: 23/28

## 2021-12-08 ENCOUNTER — HOSPITAL ENCOUNTER (OUTPATIENT)
Dept: PHYSICAL THERAPY | Facility: CLINIC | Age: 61
Discharge: HOME | End: 2021-12-08

## 2021-12-08 PROCEDURE — 9900000072 HC NEUROMUSCULAR RE-EDUCATION (SELF-PAY)

## 2021-12-08 PROCEDURE — 97112 NEUROMUSCULAR REEDUCATION: CPT

## 2021-12-08 PROCEDURE — 97110 THERAPEUTIC EXERCISES: CPT

## 2021-12-08 PROCEDURE — 9900000067 HC THERAPEUTIC EXERCISE EA 15 MINS (SELF-PAY)

## 2021-12-08 NOTE — FLOWSHEET NOTE
[] [facility]) - [facility] CENTER &  Therapy  [address]  [phone]  F: [phone] [x] [address]  [address]   [address]  P: [phone]  F: [phone] [] [address]  [address]  P: [phone]  F: [phone] [] [address]  P: [phone]  F: [phone] [] [address]  [facility]   [address]   [place]: [phone]  F: [phone]      Physical Therapy Daily Treatment Note    Date:  2021  Patient Name:  Lashanda Zavala    :  1960  MRN: [id number]  Physician: Dr. Shania Figueroa MD                                    Insurance: RETAIL  Medical Diagnosis: Spinal stenosis of lumbar region with neurogenic claudication; Parkinson's Disease  Rehab Codes: R26.89, M61.81,   Next 's appt.: 21  Date of symptom onset: 21  Visit# / total visits: 3/12     Cancels/No Shows: 0    Subjective:    Pain:  [x] Yes  [] No Location: L hip/low back N/A Pain Rating: (0-10 scale) \"mild\"/10  Pain altered Tx:  [] No  [] Yes  Action:  Comments: Pt arrives 9 min late, notes he's been working on HEP at home and even having friends try verbal fluency tasks.     Objective:    on MiniBEST      Modalities:   Precautions:  Exercises:  Exercise Reps/ Time Weight/ Level Comments   MiniBEST testing x  See additional flowsheet for specifics         Balance      Fwd/retro sway, NBOS   NBOS - narrow base of support   Lateral lean into stepping strategies   Both sides, progressing to no count down and increased lean   Tandem amb w/ verbal fluency 8x30 ft   \"as many things as you can say that begin with the letter 'b,d' \"  - slow completion d/t difficulty   Tandem stance w/ head turns 3x10 ea  Slow completion d/t difficulty Gait      BIG arm swing and steps  600 ft     Retro gait  3 min  Big steps emphasis   Retro gait w/ head/shoulder turn 5 min  Calling out therapist's held numbers         HIIT training      Circuit 1 5 min RPE: 12 1) Sumo squat - 15#, 10x  2) Fwd lunge - AROM, 10x ea  3) Squat with overhead snatch - 5#, 10x ea   Circuit 2 5 min RPE: 14 1) Reach to toes then ceiling - 15x  2) 2 BIG laps   3) Side step w/ arm reach         Seated      Thoracic ext on chair 2x10     Bilateral ER 2x10 AROM    Other:    MiniBEST exam, gait obstacle training, treadmill training w/ armswing, circuit training BLEs/arm swing/trunk rotation, gastroc stretch/strengthening      Treatment Charges: Mins Units   []  Modalities     [x]  Ther Exercise 30 2   []  Manual Therapy     []  Ther Activities     []  Aquatics     []  Vasocompression     [x]  Other: neuro 10 1   [x]  Other: gait 10 --   Total Treatment time 50 3   RETIAL CHARGES    Assessment: [x] Progressing toward goals. Pt demos improving ability to achieve fast, BIG ambulation with increased armswing/step length - does require min VCs for increased LUE armswing. Added BLE/BIG movement circuit training to alow higher intensity training for neuroplasticity. Pt still demos difficulty with dual task training with verbal fluency, with mild improvement since last visit. Difficulty with maintaining COG when instructed to improve thoracic extension/scap retraction in standing under narrow MAXINE conditions - emphasized this positioning for home when working on balance - pt verbalizes understanding. [] No change. [] Other:    [x] Bret Boykin is a 56 yo male who presents with higher-level gait, balance, and postural deficits with BUE tremor (L>R), consistent with new diagnosis of Parkinson's disease.  Pt will benefit from skilled physical therapy to address B ankle/hamstring ROM, increase hip strength from mild deficit, postural focus, improve arm swing/heel strike and other gait mechaincs to improve efficiency and safety, and progress gait negotiation of obstacles/dual tasking to improve community safety and increase community participation.        STG: (to be met in 6 treatments)  1. ? Pain: No more than 2/10 max pain in low back after therapy  2. ? ROM: At least 5deg DF PROM bilat to indicate improved ankle mobility for improved gait/stair mechanics at ankle   3. ? Balance: At least 4 point improvement on MiniBEST initial assessment score to indicate progression towards significant improvement across balance systems. 4. ? Strength:   a. At least 4+/5 B ankle PF to allow improved push off with gait to increase efficiency, reduce progression towards festinating gait  b. At least 5-/5 B hip ext/abd to allow further improved pelvic stability in single limb stance for gait/stair climbing  5. ? Function:   a. Able to ambulate at 1.1m/s with improved B armswing and trunk rotation, and increasing step length and heel strike to indicate improved gait mechanics  b. Able to negotiate head turns, step overs in gait without more than minimal balance treatment/slowed speed  6. Patient to be independent with home exercise program as demonstrated by performance with correct form without cues. 7. Demonstrate Knowledge of fall prevention     LTG: (to be met in 12 treatments)  1. Ability to achieve neutral thoracic/cervical posture without cueing from therapist throughout session  2. At least 5-/5 B ankle PF strength to allow further improved gait mechanics/efficiency, reduce tendency for progression to festinating gait  3. At least 6-point improvement on MiniBEST from initial assessment to indicate clinically significant improvement across balance systems. 4. Able to ambulate at 1.2 m/s with appropriate B armswing and trunk rotation, and increasing step length and consistent heel strike - all without cuing from therapist - to indicate improved gait mechanics  5.  Able to negotiate obstacles in gait path including step overs, compliant surfaces without slowed gait speed, good balance throughout        Patient goals: \"balance, know what I need to do\"    Pt. Education:  [x] Yes  [] No  [] Reviewed Prior HEP/Ed  Method of Education: [x] Verbal  [] Demo  [] Written  Comprehension of Education:  [x] Verbalizes understanding. [] Demonstrates understanding. [] Needs review. [] Demonstrates/verbalizes HEP/Ed previously given. Access Code: [email or web address]  URL: [employer]/  Date: 12/08/2021  Prepared by: China Mandaeism    Exercises  Seated Thoracic Lumbar Extension - 1-2 x daily - 7 x weekly - 1 sets - 10 reps  Shoulder External Rotation and Scapular Retraction - 1-2 x daily - 7 x weekly - 2 sets - 15 reps  BIG walking with increased step length/arm swing - 5 min/day  Tandem stance with head turns - 2x10 ea  Tandem amb w/ verbal fluency -      Plan: [x] Continue current frequency toward long and short term goals.     [x] Specific Instructions for subsequent treatments: postural/BLE circuits, further balance training within gait, on compliant surfaces      Time In: 9:09 am            Time Out: 10:05 am    Electronically signed by:  Diaz Foreman PT

## 2021-12-13 ENCOUNTER — HOSPITAL ENCOUNTER (OUTPATIENT)
Dept: PHYSICAL THERAPY | Facility: CLINIC | Age: 61
Discharge: HOME | End: 2021-12-13

## 2021-12-13 PROCEDURE — 9900000072 HC NEUROMUSCULAR RE-EDUCATION (SELF-PAY)

## 2021-12-13 PROCEDURE — 9900000067 HC THERAPEUTIC EXERCISE EA 15 MINS (SELF-PAY)

## 2021-12-13 NOTE — FLOWSHEET NOTE
[] [facility]) - [facility]  [facility]  [address]  [phone]  F: [phone] [x] [address]  [facility]   [address]  P: [phone]  F: [phone] [] [address]  [address]  P: [phone]  F: [phone] [] [address]  P: [phone]  F: [phone] [] [address]  [facility]   [address]   [place]: [phone]  F: [phone]      Physical Therapy Daily Treatment Note    Date:  2021  Patient Name:  Harriet Wang    :  1960  MRN: [id number]  Physician: Dr. Liliam Zavala MD                                    Insurance: RETAIL  Medical Diagnosis: Spinal stenosis of lumbar region with neurogenic claudication; Parkinson's Disease  Rehab Codes: R26.89, M61.81,   Next 's appt.: 21  Date of symptom onset: 21  Visit# / total visits:      Cancels/No Shows: 0    Subjective:    Pain:  [x] Yes  [] No Location: L hip/low back N/A Pain Rating: (0-10 scale) \"mild\"/10  Pain altered Tx:  [] No  [] Yes  Action:  Comments: Pt arrives 9 min late, notes he's been working on HEP at home and even having friends try verbal fluency tasks.     Objective:    on MiniBEST      Modalities:   Precautions:  Exercises:  Exercise Reps/ Time Weight/ Level Comments   True bike 5 mn L5    Calf incline board 3x30\"     Calf stair stretch 2x30\" ea     Calf runner's stretch 30\" ea  demo'd for HEP         Balance      Fwd/retro sway, NBOS   NBOS - narrow base of support   Lateral lean into stepping strategies   Both sides, progressing to no count down and increased lean   Tandem amb w/ verbal fluency 4x30 ft   \"as many things as you can say that begin with the letter 'b,d' \"  - slow completion d/t difficulty   Tandem stance w/ head turns, up/down 3x10 ea  Slow completion d/t difficulty               Gait      BIG arm swing and steps       Faster gait w/ heel strike emphasis 2x900 ft  3x150  Second set after treadmill practice; intermittent laps (1 lap = 150')    Treadmill train 10 min 1.4-1.7 mph CGA, BUE assist by pt   Retro gait    Big steps emphasis   Retro gait w/ head/shoulder turn   Calling out therapist's held numbers         HIIT training      Circuit 1  RPE: 12 1) Sumo squat - 15#, 10x  2) Fwd lunge - AROM, 10x ea  3) Squat with overhead snatch - 5#, 10x ea   Circuit 2  RPE: 14 1) Reach to toes then ceiling - 15x  2) 2 BIG laps   3) Side step w/ arm reach         Seated      Thoracic ext on chair 2x10  Against bolster - first set with manual ovp by therapist   Bilateral ER 2x10 AROM    Other:    MiniBEST exam, gait obstacle training, treadmill training w/ armswing, circuit training BLEs/arm swing/trunk rotation, gastroc stretch/strengthening      Treatment Charges: Mins Units   []  Modalities     [x]  Ther Exercise 12 1   []  Manual Therapy     []  Ther Activities     []  Aquatics     []  Vasocompression     [x]  Other: neuro 10 1   [x]  Other: gait 31 2   Total Treatment time 53 4   RETIAL CHARGES    Assessment: [x] Progressing toward goals. Gait train focus this date - continued to progress mechanics with emphasis on heel strike this date, in combination with prior training of increased step length/width and pace. Does require mod-max VCs for increased heel strike, step length, and min VCs for wider MAXINE. Pt able to better carryover this training at other times within session, and use of treadmill does seem to improve carryover onto level ground. Pt to continue practicing this at home, as well as adding calf stretch for further active DF mobility. Still challenged with tandem stance holds with head movement, as well as min A needed for max end range for thoracic ext/B shoulder ER.    [] No change.      [] Other:    [x] Zain Ryan is a 58 yo male who presents with higher-level gait, balance, and postural deficits with BUE tremor (L>R), consistent with new diagnosis of Parkinson's disease. Pt will benefit from skilled physical therapy to address B ankle/hamstring ROM, increase hip strength from mild deficit, postural focus, improve arm swing/heel strike and other gait mechaincs to improve efficiency and safety, and progress gait negotiation of obstacles/dual tasking to improve community safety and increase community participation.        STG: (to be met in 6 treatments)  1. ? Pain: No more than 2/10 max pain in low back after therapy  2. ? ROM: At least 5deg DF PROM bilat to indicate improved ankle mobility for improved gait/stair mechanics at ankle   3. ? Balance: At least 4 point improvement on MiniBEST initial assessment score to indicate progression towards significant improvement across balance systems. 4. ? Strength:   a. At least 4+/5 B ankle PF to allow improved push off with gait to increase efficiency, reduce progression towards festinating gait  b. At least 5-/5 B hip ext/abd to allow further improved pelvic stability in single limb stance for gait/stair climbing  5. ? Function:   a. Able to ambulate at 1.1m/s with improved B armswing and trunk rotation, and increasing step length and heel strike to indicate improved gait mechanics  b. Able to negotiate head turns, step overs in gait without more than minimal balance treatment/slowed speed  6. Patient to be independent with home exercise program as demonstrated by performance with correct form without cues. 7. Demonstrate Knowledge of fall prevention     LTG: (to be met in 12 treatments)  1. Ability to achieve neutral thoracic/cervical posture without cueing from therapist throughout session  2. At least 5-/5 B ankle PF strength to allow further improved gait mechanics/efficiency, reduce tendency for progression to festinating gait  3.  At least 6-point improvement on MiniBEST from initial assessment to indicate clinically significant improvement across balance systems. 4. Able to ambulate at 1.2 m/s with appropriate B armswing and trunk rotation, and increasing step length and consistent heel strike - all without cuing from therapist - to indicate improved gait mechanics  5. Able to negotiate obstacles in gait path including step overs, compliant surfaces without slowed gait speed, good balance throughout        Patient goals: \"balance, know what I need to do\"    Pt. Education:  [x] Yes  [] No  [] Reviewed Prior HEP/Ed  Method of Education: [x] Verbal  [] Demo  [] Written  Comprehension of Education:  [x] Verbalizes understanding. [] Demonstrates understanding. [] Needs review. [] Demonstrates/verbalizes HEP/Ed previously given. Access Code: [email or web address]  URL: [email or web address]  Date: 12/08/2021  Prepared by: Patito Learn    Exercises  Seated Thoracic Lumbar Extension - 1-2 x daily - 7 x weekly - 1 sets - 10 reps  Shoulder External Rotation and Scapular Retraction - 1-2 x daily - 7 x weekly - 2 sets - 15 reps  BIG walking with increased step length/arm swing - 5 min/day  Tandem stance with head turns - 2x10 ea  Tandem amb w/ verbal fluency - 3-4 min  Calf stretching on stair or standing -  2x30\" ea    Plan: [x] Continue current frequency toward long and short term goals.     [x] Specific Instructions for subsequent treatments: postural/BLE circuits, further balance training within gait, on compliant surfaces      Time In: 9:01 am            Time Out: 10:00 am    Electronically signed by:  Deepthi Barth PT

## 2021-12-15 ENCOUNTER — HOSPITAL ENCOUNTER (OUTPATIENT)
Dept: PHYSICAL THERAPY | Facility: CLINIC | Age: 61
Discharge: HOME | End: 2021-12-15

## 2021-12-15 PROCEDURE — 9900000072 HC NEUROMUSCULAR RE-EDUCATION (SELF-PAY)

## 2021-12-15 PROCEDURE — 9900000067 HC THERAPEUTIC EXERCISE EA 15 MINS (SELF-PAY)

## 2021-12-15 NOTE — FLOWSHEET NOTE
stance w/ head turns, up/down   Slow completion d/t difficulty               Gait      Treadmill train 10 min  5 min 1.4-1.7 mph CGA, BUE assist by pt   BIG arm swing and steps       Faster gait w/ heel strike emphasis, upright posture 3x900 ft  3x150  Second set after treadmill practice; intermittent laps (1 lap = 150')    \" \" w/ cog dual task 7 min total  Counting down from 100 by 3's  - very difficult         Retro gait    Big steps emphasis   Retro gait w/ head/shoulder turn   Calling out therapist's held numbers         HIIT training      Circuit 1  RPE: 12 1) Sumo squat - 15#, 10x  2) Fwd lunge - AROM, 10x ea  3) Squat with overhead snatch - 5#, 10x ea   Circuit 2  RPE: 14 1) Reach to toes then ceiling - 15x  2) 2 BIG laps   3) Side step w/ arm reach         Posture      Pec minor doorway stretch 2x30\" ea     Mid row 2x10 blue    Thoracic ext on chair   Against bolster - first set with manual ovp by therapist   Bilateral ER 2x10 AROM    Unilat ER x lime Attempted, too weak   Other:    MiniBEST exam, gait obstacle training, treadmill training w/ armswing, circuit training BLEs/arm swing/trunk rotation, gastroc stretch/strengthening      Treatment Charges: Mins Units   []  Modalities     [x]  Ther Exercise 16 1   []  Manual Therapy     []  Ther Activities     []  Aquatics     []  Vasocompression     []  Other: neuro     [x]  Other: gait 40 3   Total Treatment time 56 4   RETIAL CHARGES    Assessment: [x] Progressing toward goals. Gait train focus this date - continued to progress mechanics with emphasis on heel strike this date, in combination with prior training of increased step length/width and pace. Does require mod-max VCs for increased heel strike, step length, and min VCs for wider MAXINE. Pt able to better carryover this training at other times within session, and use of treadmill does seem to improve carryover onto level ground.  Pt to continue practicing this at home, as well as adding calf stretch for further active DF mobility. Still challenged with tandem stance holds with head movement, as well as min A needed for max end range for thoracic ext/B shoulder ER.    [] No change. [] Other:    [x] Cathy Powers is a 56 yo male who presents with higher-level gait, balance, and postural deficits with BUE tremor (L>R), consistent with new diagnosis of Parkinson's disease. Pt will benefit from skilled physical therapy to address B ankle/hamstring ROM, increase hip strength from mild deficit, postural focus, improve arm swing/heel strike and other gait mechaincs to improve efficiency and safety, and progress gait negotiation of obstacles/dual tasking to improve community safety and increase community participation.        STG: (to be met in 6 treatments)  1. ? Pain: No more than 2/10 max pain in low back after therapy  2. ? ROM: At least 5deg DF PROM bilat to indicate improved ankle mobility for improved gait/stair mechanics at ankle   3. ? Balance: At least 4 point improvement on MiniBEST initial assessment score to indicate progression towards significant improvement across balance systems. 4. ? Strength:   a. At least 4+/5 B ankle PF to allow improved push off with gait to increase efficiency, reduce progression towards festinating gait  b. At least 5-/5 B hip ext/abd to allow further improved pelvic stability in single limb stance for gait/stair climbing  5. ? Function:   a. Able to ambulate at 1.1m/s with improved B armswing and trunk rotation, and increasing step length and heel strike to indicate improved gait mechanics  b. Able to negotiate head turns, step overs in gait without more than minimal balance treatment/slowed speed  6. Patient to be independent with home exercise program as demonstrated by performance with correct form without cues. 7. Demonstrate Knowledge of fall prevention     LTG: (to be met in 12 treatments)  1.  Ability to achieve neutral thoracic/cervical posture without cueing from therapist throughout session  2. At least 5-/5 B ankle PF strength to allow further improved gait mechanics/efficiency, reduce tendency for progression to festinating gait  3. At least 6-point improvement on MiniBEST from initial assessment to indicate clinically significant improvement across balance systems. 4. Able to ambulate at 1.2 m/s with appropriate B armswing and trunk rotation, and increasing step length and consistent heel strike - all without cuing from therapist - to indicate improved gait mechanics  5. Able to negotiate obstacles in gait path including step overs, compliant surfaces without slowed gait speed, good balance throughout        Patient goals: \"balance, know what I need to do\"    Pt. Education:  [x] Yes  [] No  [x] Reviewed Prior HEP/Ed  Method of Education: [x] Verbal  [] Demo  [x] Written  Comprehension of Education:  [x] Verbalizes understanding. [] Demonstrates understanding. [] Needs review. [] Demonstrates/verbalizes HEP/Ed previously given. Access Code: [email or web address]  URL: [email or web address]  Date: 12/08/2021  Prepared by: Luna Rios    Exercises  Seated Thoracic Lumbar Extension - 1-2 x daily - 7 x weekly - 1 sets - 10 reps  Shoulder External Rotation and Scapular Retraction - 1-2 x daily - 7 x weekly - 2 sets - 15 reps  BIG walking with increased step length/arm swing - 5 min/day  Tandem stance with head turns - 2x10 ea  Tandem amb w/ verbal fluency - 3-4 min  Calf stretching on stair or standing -  2x30\" ea  Doorway pec stretch - 3x30\" ea    Plan: [x] Continue current frequency toward long and short term goals.     [x] Specific Instructions for subsequent treatments: postural/BLE circuits, further balance training within gait, on compliant surfaces      Time In: 9:01 am            Time Out: 10:03 am    Electronically signed by:  Nichole Carty PT

## 2021-12-20 ENCOUNTER — HOSPITAL ENCOUNTER (OUTPATIENT)
Dept: PHYSICAL THERAPY | Facility: CLINIC | Age: 61
Discharge: HOME | End: 2021-12-20

## 2021-12-20 NOTE — FLOWSHEET NOTE
[] [facility]) [facility]  Therapy  [address]    [phone]  F: [phone]   [x] [address]  [facility]   [address]  P: [phone]  F: [phone]  [] [place]  [address]  P: [phone]  F: [phone] [] [address]  P: [phone]  F: [phone]  [] [address]  [facility]   [address]   [place]: [phone]  F: [phone]   [] [address]  [place]: [phone]   F: [phone]     Physical Therapy Cancel/No Show note    Date: 2021  Patient: Adriana Fall  : 1960  MRN: [id number]    Cancels/No Shows to date: 1 cx / 0 ns    For today's appointment patient:    [x]  Cancelled    [] Rescheduled appointment    [] No-show     Reason given by patient:    [x]  Patient ill    []  Conflicting appointment    [] No transportation      [] Conflict with work    [] No reason given    [] Weather related    [] [id number]    [] Other:      Comments:        [x] Next appointment was confirmed    Electronically signed by: Bertha Gunn PT

## 2022-01-10 ENCOUNTER — HOSPITAL ENCOUNTER (OUTPATIENT)
Dept: PHYSICAL THERAPY | Facility: CLINIC | Age: 62
Setting detail: THERAPIES SERIES
Discharge: HOME | End: 2022-01-10

## 2022-01-10 PROCEDURE — 9900000072 HC NEUROMUSCULAR RE-EDUCATION (SELF-PAY)

## 2022-01-10 NOTE — FLOWSHEET NOTE
step, OR would fall if not caught, OR falls spontaneously    5. COMPENSATORY STEPPING CORRECTION - BACKWARD  Instruction: \"Stand with your feet shoulder width apart, arms at your sides. Lean backward against my hands beyond your backward limits. When I let go, do whatever is necessary, including taking a step, to avoid a fall. \"  (2) Normal: Recovers independently with a single, large step (second realignment step is allowed)  (1) Moderate: More than one step used to recover equilibrium.  (0) Severe: No step, OR would fall if not caught, OR falls spontaneously    6. COMPENSATORY STEPPING CORRECTION - LATERAL  Instruction: \"Stand with your feet together, arms down at your sides. Lean into my hand beyond your sideways limit. When I let go, do whatever is necessary, including taking a step, to avoid a fall\". Left        Right  (2) Normal: Recovers independently with 1 step   (2) Normal: Recovers independently with 1 step   (1) Moderate: Several Steps to recover equilibrium  (1) Moderate: Several Steps to recover equilibrium  (0) Severe: Falls, or cannot step    (0) Severe: Falls, or cannot step  Use the side with the lowest score to calculate sub-score and total score. Sensory Orientation    SUBSCORE: 5/6  7. STANCE (FEET TOGETHER); EYES OPEN, FIRM SURFACE  Instruction: \"Place your hands on your hips. Place your feet together until almost touching. Look straight ahead. Be as stable and still as possible, until I say stop. \"  Time in seconds: 30  (2) Normal: 30s  (1) Moderate: <30s  (0) Severe: Unable    8. STANCE (FEET TOGETHER; EYES CLOSED, FOAM SURFACE  Instruction: \"Step onto the foam. Place your hands on your hips. Place your feet together until almost touching. Be as stable and as still as possible, until I say stop. I will start timing when you close your eyes\". Time in seconds: 30s  (2) Normal: 30s  (1) Moderate: <30s  (0) Severe: Unable    9.    INCLINE - EYES CLOSED  Instruction: \"Step onto the incline ramp. Please stand on the incline ramp with your toes toward the top. Place your feet shoulder width apart and have your arms down at your sides. I will start timing when you close your eyes. Time in seconds: 23s  (2) Normal: Stands independently 30s and aligns with gravity  (1) Moderate: Stands independently <30s OR aligns with surface  (0) Severe: Unable        Dynamic Gait     SUBSCORE: 8/10  10. CHANGE IN GAIT SPEED  Instruction: \"Begin walking at your normal speed, when I tell you \"fast\", walk as fast as you can. When I say \"slow\", walk very slowly\". (2) Normal: Significantly changes walking speed without imbalance. (1) Moderate: Unable to change walking speed or signs of imbalance  (0) Severe: Unable to achieve significant change in walking speed AND signs of imbalance    11. WALK WITH HEAD TURNS - HORIZONTAL  Instruction: \"Begin walking at your normal speed, when I say \"right\", turn your head and look to the right. When I say \"left\", turn your head and look to the left. Try to keep yourself walking in a straight line. \"  (2) Normal: Performs head turns with no change in gait speed and good balance. (1) Moderate: Performs head turns with reduction in gait speed. (0) Severe: Performs head turns with imbalance. 12. WALK WITH PIVOT TURNS  Instruction: \"Begin walking at your normal speed. When I tell you to 'turn and stop', turn as quickly as you can, face the opposite direction, and stop. After the turn, your feet should be close together. (2) Normal: Turns with feet close FAST (<3 steps) with good balance. (1) Moderate: Turns with feet close SLOW (> or = 4 steps) with good balance. (0) Severe: Cannot turn with feet close at any speed without imbalance. 13. STEP OVER OBSTACLES  Instruction: \"Begin walking at your normal speed. When you get to the box, step over it, not around it and keep walking. \"  (2) Normal: Able to step over box with minimal change of gait speed and with good balance. (1) Moderate: Steps over box but touches box OR displays cautious behavior by slowing gait. (0) Severe: Unable to step over box OR steps around box    14. TIMED UP & GO WITH DUAL TASK (3 METER WALK)  Instruction TUG: \"When I say \"Go\", stand up from the chair, walk at your normal speed across the tape on the floor, turn around, and come back to sit in the chair. \"  Instruction TUG with Dual Task: \"Count backwards by threes starting at ____. When I say \"Go\", stand up from the chair, walk your normal speed across the tape on the floor, turn around, and come back to sit in the chair. Continue counting backwards the entire time. TU.18s seconds;  Dual Task TU.49 seconds (16% dual task cost)  (2) Normal: No noticeable change in sitting, standing, or walking while backward counting when compared to TUG without dual Task. (1) Moderate: Dual task affects either counting OR walking (>10%) when compared to the TUG without Dual Task  (0) Severe: Stops counting while walking OR stops walking while counting  When scoring item 14, if subject's gait speed slows more than 10% between the TUG without and with a Dual Task the score should be decreased by a point.        TOTAL SCORE: 23/28

## 2022-01-10 NOTE — FLOWSHEET NOTE
[] [facility]) - [facility] &  Therapy  [address]  [phone]  F: [phone] [x] [address]  [address]   [address]  P: [phone]  F: [phone] [] [place]  [address]  P: [phone]  F: [phone] [] [address]  P: [phone]  F: [phone] [] [address]  [facility]   [address]   [place]: [phone]  F: [phone]      Physical Therapy Daily Treatment Note    Date:  1/10/2022  Patient Name:  Kimmy Li    :  1960  MRN: [id number]  Physician: Dr. Kayla Silva MD                                    Insurance: RETAIL  Medical Diagnosis: Spinal stenosis of lumbar region with neurogenic claudication; Parkinson's Disease  Rehab Codes: R26.89, M61.81,   Next 's appt.: 21  Date of symptom onset: 21  Visit# / total visits: 12     Cancels/No Shows: 0    Subjective:    Pain:  [x] Yes  [] No Location: L hip/low back N/A Pain Rating: (0-10 scale) \"mild\"/10  Pain altered Tx:  [] No  [] Yes  Action:  Comments: Pt arrives after break from therapy d/t vacation - notes he \"got away from therapy exercises\" at this point but also did note some issues with his speech fading the longer he talks.     Objective:    on MiniBEST today, 1/10 ( on eval)  10MWT: 1.1m/s self selected, 1.43m/s fast (1.04m/s self selected, 1.56 m/s fast on eval, and of note, pt demo'd poor balance/anteropulsive gait at fast speed on eval, which was improved today 1/10)  4+/5 B PF strength  4+/5 B hip ext/abd strength  16% dual cost noted on TUG/TUG cognitive per MiniBEST  5deg R ankle DF PROM, 2deg L    Modalities:   Precautions:  Exercises: Bolded completed 1/10/2022: Limited exercise completion d/t goal assessment, see below:  Exercise Reps/ Time Weight/ Level Comments   True bike 6 mn L5    Calf incline board 3x30\"     Calf stair stretch      Calf runner's stretch   demo'd for HEP         Balance      Fwd/retro sway, NBOS   NBOS - narrow base of support   Lateral lean into stepping strategies   Both sides, progressing to no count down and increased lean   Tandem amb w/ verbal fluency    \"as many things as you can say that begin with the letter 'b,d' \"  - slow completion d/t difficulty   Tandem stance w/ head turns, up/down   Slow completion d/t difficulty               Gait      Treadmill train 10 min   1.6-2.0 mph CGA, progressing to no UE assist within each speed increase   BIG arm swing and steps       Faster gait w/ heel strike emphasis, upright posture 3x900 ft  3x150  Second set after treadmill practice; intermittent laps (1 lap = 150')    \" \" w/ cog dual task 7 min total  Counting down from 100 by 3's  - very difficult         Retro gait    Big steps emphasis   Retro gait w/ head/shoulder turn   Calling out therapist's held numbers         HIIT training      Circuit 1  RPE: 12 1) Sumo squat - 15#, 10x  2) Fwd lunge - AROM, 10x ea  3) Squat with overhead snatch - 5#, 10x ea   Circuit 2  RPE: 14 1) Reach to toes then ceiling - 15x  2) 2 BIG laps   3) Side step w/ arm reach         Posture      Pec minor doorway stretch 2x30\" ea     Mid row 2x10 blue    Thoracic ext on chair   Against bolster - first set with manual ovp by therapist   Bilateral ER 2x10 AROM    Unilat ER x lime Attempted, too weak   Other: Majority of time spent in STG assessment, including 10MWT, MiniBEST administration, as well as education re: PT POC to address remaining impairments, progress made, need for community-level classes for further gains - billed with neuro    MiniBEST exam, gait obstacle training, treadmill training w/ armswing, circuit training BLEs/arm swing/trunk rotation, gastroc stretch/strengthening      Treatment Charges: Mins Units   []  Modalities     []  Ther Exercise     []  Manual Therapy     []  Ther Activities     []  Aquatics     []  Vasocompression     [x]  Other: neuro 54 4   Total Treatment time 54 4   RETIAL CHARGES - all neuro d/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 STG assessment this date - Pt has made fair progress with ankle strength/mobility, minimal with hip strength. Does have notable improvement in self selected gait speed as well as overall mechanics even with obstacles. Somatosensory deficit present with eyes closed, especially on uneven surface. Noted to have continued gait difficulty/anteropulsive especially on treadmill or with concurrent cognitive task - requires mod-max cuing to improve though pt does note he is aware of when gait mechanics worsen, has difficulty correcting. Will benefit from continued focus on gait training as well as dual tasking within gait, SLS balance on LLE, and emphasis on BIG movements with BLE/UEs and additional posterior chain/extensor strengthening with remaining therapy visits. [] No change. [x] Other: Provided pt with [facility] for [employer] for community-level exercise. [x] Jeri Nair is a 56 yo male who presents with higher-level gait, balance, and postural deficits with BUE tremor (L>R), consistent with new diagnosis of Parkinson's disease.  Pt will benefit from skilled physical therapy to address B ankle/hamstring ROM, increase hip strength from mild deficit, postural focus, improve arm swing/heel strike and other gait mechaincs to improve efficiency and safety, and progress gait negotiation of obstacles/dual tasking to improve community safety and increase community participation.        STG: (to be met in 6 treatments) - Assessed on 1/10/22 by Kylie Ley, PT:  1. ? Pain: No more than 2/10 max pain in low back after therapy - MET, 2/10 at worst  2. ? ROM: At least 5deg DF PROM bilat to indicate improved ankle mobility for improved gait/stair mechanics at ankle  - Partially met, 5deg R, 2deg L  3. ? Balance: At least 4 point improvement on MiniBEST initial assessment score to indicate progression towards significant improvement across balance systems. 4. ? Strength:   a. At least 4+/5 B ankle PF to allow improved push off with gait to increase efficiency, reduce progression towards festinating gait - MET, 4+/5 bilat  b. At least 5-/5 B hip ext/abd to allow further improved pelvic stability in single limb stance for gait/stair climbing - NOT MET, 4+/5 B hip ext/abd  5. ? Function:   a. Able to ambulate at 1.1m/s with improved B armswing and trunk rotation, and increasing step length and heel strike to indicate improved gait mechanics - MET, 1.1m/s self selected; 1.43m/s fast  b. Able to negotiate head turns, step overs in gait without more than minimal balance treatment/slowed speed - MET, only minimal balance impairment noted very minimally (<5% of the time), but does need cues for heel strike/larger steps/LUE arm swing but this is at baseline as well without head turns  6. Patient to be independent with home exercise program as demonstrated by performance with correct form without cues. - MET  7. Demonstrate Knowledge of fall prevention     LTG: (to be met in 12 treatments)  1. Ability to achieve neutral thoracic/cervical posture without cueing from therapist throughout session  2. At least 5-/5 B ankle PF strength to allow further improved gait mechanics/efficiency, reduce tendency for progression to festinating gait  3. At least 6-point improvement on MiniBEST from initial assessment to indicate clinically significant improvement across balance systems. 4. Able to ambulate at 1.2 m/s with appropriate B armswing and trunk rotation, and increasing step length and consistent heel strike - all without cuing from therapist - to indicate improved gait mechanics  5.  Able to negotiate obstacles in gait path including step overs, compliant surfaces without slowed gait speed, good balance throughout        Patient goals: \"balance, know what I need to do\"    Pt. Education:  [x] Yes  [] No  [x] Reviewed Prior HEP/Ed  Method of Education: [x] Verbal  [] Demo  [x] Written  Comprehension of Education:  [x] Verbalizes understanding. [] Demonstrates understanding. [] Needs review. [] Demonstrates/verbalizes HEP/Ed previously given. Access Code: [email or web address]  URL: [email or web address]  Date: 12/08/2021  Prepared by: Nadine Livingston    Exercises  Seated Thoracic Lumbar Extension - 1-2 x daily - 7 x weekly - 1 sets - 10 reps  Shoulder External Rotation and Scapular Retraction - 1-2 x daily - 7 x weekly - 2 sets - 15 reps  BIG walking with increased step length/arm swing - 5 min/day  Tandem stance with head turns - 2x10 ea  Tandem amb w/ verbal fluency - 3-4 min  Calf stretching on stair or standing -  2x30\" ea  Doorway pec stretch - 3x30\" ea  SLS holds - 4-5x, 20-30\" ea    Plan: [x] Continue current frequency toward long and short term goals.     [x] Specific Instructions for subsequent treatments: SLS holds, tandem amb/gait challenges with dual task, gait training on treadmill with progressive increased speed and upright trunk, bigger steps      Time In: 11:02 am            Time Out: 12:10 pm    Electronically signed by:  Rebekah Hernandez, PT

## 2022-01-13 ENCOUNTER — OFFICE VISIT (OUTPATIENT)
Dept: PHYSICAL MEDICINE AND REHAB | Age: 62
End: 2022-01-13
Payer: COMMERCIAL

## 2022-01-13 VITALS
BODY MASS INDEX: 30.75 KG/M2 | TEMPERATURE: 97.4 F | DIASTOLIC BLOOD PRESSURE: 84 MMHG | HEIGHT: 72 IN | WEIGHT: 227 LBS | SYSTOLIC BLOOD PRESSURE: 138 MMHG | HEART RATE: 59 BPM

## 2022-01-13 DIAGNOSIS — M48.062 SPINAL STENOSIS OF LUMBAR REGION WITH NEUROGENIC CLAUDICATION: Primary | ICD-10-CM

## 2022-01-13 DIAGNOSIS — G20 PARKINSON'S DISEASE (HCC): ICD-10-CM

## 2022-01-13 PROCEDURE — 99212 OFFICE O/P EST SF 10 MIN: CPT | Performed by: PHYSICAL MEDICINE & REHABILITATION

## 2022-01-13 NOTE — PROGRESS NOTES
[facility] PHYSICAL MEDICINE AND REHABILITATION  [address] [place] [address]  Dept: [phone]  Dept Fax: [phone]    Outpatient Followup Note    Kyrie Ngo, 57 y.o., male, presents for follow up c/o of Follow-up (low back pain)  . HPI:     HPI  Patient with chronic low back pain who is being seen in follow up today. He notes some continued improvement in his pain, strength and mobility with physical therapy. L sided low back radiating pain is improved - it is less frequent and less severe. He is noticing some improvement in his mobility with Sinemet as well. He is transitioning Parkinson's care to Dr. Juan Wang. He has been treated for hernias and post-operatively R flank pain was diagnosed and treated for constipation (after confirmation with abdominal CT). His R flank pain resolved.      Past Medical History:   Diagnosis Date    Back pain     Reflux esophagitis     Sleep apnea     Tremor       Past Surgical History:   Procedure Laterality Date    ABDOMINAL HERNIA REPAIR      APPENDECTOMY      BACK SURGERY      BLADDER SURGERY      ROTATOR CUFF REPAIR       Family History   Problem Relation Age of Onset    High Blood Pressure Mother     Stroke Mother     High Blood Pressure Father      Social History     Socioeconomic History    Marital status:      Spouse name: Not on file    Number of children: Not on file    Years of education: Not on file    Highest education level: Not on file   Occupational History    Not on file   Tobacco Use    Smoking status: Former Smoker     Packs/day: 0.00     Years: 0.00     Pack years: 0.00     Quit date: 2008     Years since quittin.9    Smokeless tobacco: Never Used   Vaping Use    Vaping Use: Never used   Substance and Sexual Activity    Alcohol use: Yes     Comment: socially    Drug use: No    Sexual activity: Not on file   Other Topics Concern    Not on file   Social History Narrative    Not on file     Social Determinants of Health     Financial Resource Strain: Low Risk     Difficulty of Paying Living Expenses: Not hard at all   Food Insecurity: No Food Insecurity    Worried About Running Out of Food in the Last Year: Never true    [address] in the Last Year: Never true   Transportation Needs:     Lack of Transportation (Medical): Not on file    Lack of Transportation (Non-Medical):  Not on file   Physical Activity:     Days of Exercise per Week: Not on file    Minutes of Exercise per Session: Not on file   Stress:     Feeling of Stress : Not on file   Social Connections:     Frequency of Communication with Friends and Family: Not on file    Frequency of Social Gatherings with Friends and Family: Not on file    Attends Alevism Services: Not on file    Active Member of [address] [place] [address] or Organizations: Not on file    Attends Club or Organization Meetings: Not on file    Marital Status: Not on file   Intimate Partner Violence:     Fear of Current or Ex-Partner: Not on file    Emotionally Abused: Not on file    Physically Abused: Not on file    Sexually Abused: Not on file   Housing Stability:     Unable to Pay for Housing in the Last Year: Not on file    Number of [place] in the Last Year: Not on file    Unstable Housing in the Last Year: Not on file       Current Outpatient Medications   Medication Sig Dispense Refill    pravastatin (PRAVACHOL) 20 MG tablet take 1 tablet by mouth once daily 90 tablet 0    carbidopa-levodopa (SINEMET)  MG per tablet Take half tab twice daily x 1 wk; then three times daily 270 tablet 2    tamsulosin (FLOMAX) 0.4 MG capsule take 1 capsule by mouth every evening      butalbital-acetaminophen-caffeine (FIORICET, ESGIC) -40 MG per tablet Take one tab every other day as needed for severe headache (Patient not taking: Reported on 1/13/2022) 40 tablet 0    famotidine (PEPCID) 20 MG tablet take 1 tablet by mouth at bedtime if needed once daily (Patient not taking: Reported on 1/13/2022)       Current Facility-Administered Medications   Medication Dose Route Frequency Provider Last Rate Last Admin    testosterone cypionate (DEPOTESTOTERONE CYPIONATE) injection 225 mg  225 mg IntraMUSCular Once Cristian Perez MD        testosterone cypionate (DEPOTESTOTERONE CYPIONATE) injection 100 mg  100 mg IntraMUSCular Once Cristian Perez MD        testosterone cypionate (DEPOTESTOTERONE CYPIONATE) injection 200 mg  200 mg IntraMUSCular Q14 Days Cristian Perez MD   200 mg at 12/02/14 1555    testosterone cypionate (DEPOTESTOTERONE CYPIONATE) injection 200 mg  200 mg IntraMUSCular Q14 Days Cristian Perez MD   200 mg at 11/13/14 1038     Allergies   Allergen Reactions    Poison Ivy Extract     No Known Allergies      medications       Subjective:      Review of Systems  Constitutional: Negative for fever, chills and unexpected weight change. HENT: Negative for trouble swallowing. Respiratory: Negative for cough and shortness of breath. Cardiovascular: Negative for chest pain. Endocrine: Negative for polyuria. Genitourinary: Negative for dysuria, urgency, frequency, incontinence and difficulty urinating. Gastrointestinal: Negative for constipation or diarrhea. Musculoskeletal: Positive for arthralgias. Neurological: Negative for headaches, numbness, or tingling. Psychiatric: Negative for depressed mood or anxiety. Objective:     Physical Exam  /84   Pulse 59   Temp 97.4 °F (36.3 °C)   Ht 6' (1.829 m)   Wt 227 lb (103 kg)   BMI 30.79 kg/m²   Constitutional: He appears well-developed and well-nourished. In no distress. HEENT: NCAT, PERRL, EOMI. Mucous membranes pink and moist.  Pulmonary/Chest: Respirations WNL and unlabored. MSK: Functional ROM lumbar spine and all extremities. Strength 5/5 key muscles BUE and BLEs.  No palpable muscle spasm or tightness along lumbar paraspinal muscles or over L gluteal muscle/SI joint. Neurological: He is alert and oriented to person, place, and time. DTRs 2+ and symmetric. Exhibiting masked facies. Having some resting tremor. Mild cogwheel rigidity. Bradykinesia on CARLOS testing. Gait: short shuffling stride at baseline but with focus performs longer strides  Skin: Skin is warm dry and intact with good turgor. Psychiatric: He has a normal mood and affect. His behavior is normal. Thought content normal.   Medical assistant note and vitals for today's encounter reviewed. Diagnostic Studies: None new    Assessment:       Diagnosis Orders   1. Spinal stenosis of lumbar region with neurogenic claudication     2. Parkinson's disease ([facility] [address]          Plan:      Continuing therapy. Has initial appointment with Dr. Mychal chen to establish care. Discussed pathophysiology and common symptoms, impaired function associated with Parkinson's Disease. Offered to manage rehabilitation and equipment needs. No orders of the defined types were placed in this encounter. No orders of the defined types were placed in this encounter. Return in about 6 months (around 7/13/2022). Electronically signedby Bassem Todd MD on 1/13/2022 at 5:42 PM.     Please note that this chartwas generated using voice recognition Dragon dictation software. Although everyeffort was made to ensure the accuracy of this automated transcription, some errorsin transcription may have occurred.

## 2022-01-14 ENCOUNTER — HOSPITAL ENCOUNTER (OUTPATIENT)
Dept: PHYSICAL THERAPY | Facility: CLINIC | Age: 62
Setting detail: THERAPIES SERIES
Discharge: HOME | End: 2022-01-14

## 2022-01-14 PROCEDURE — 9900000072 HC NEUROMUSCULAR RE-EDUCATION (SELF-PAY)

## 2022-01-14 NOTE — FLOWSHEET NOTE
[] [facility]) - [facility]STEP [facility] &  Therapy  [address]  [phone]  F: [phone] [x] [address]  [facility]   [address]  P: [phone]  F: [phone] [] [address] &  Therapy  [address]  P: [phone]  F: [phone] [] [address]  P: [phone]  F: [phone] [] [address]  [facility]   [address]   [place]: [phone]  F: [phone]      Physical Therapy Daily Treatment Note    Date:  2022  Patient Name:  Katleyn Obrien    :  1960  MRN: [id number]  Physician: Dr. Alexandru Marlow MD                                    Insurance: RETAIL  Medical Diagnosis: Spinal stenosis of lumbar region with neurogenic claudication; Parkinson's Disease  Rehab Codes: R26.89, M61.81,   Next 's appt.: 21  Date of symptom onset: 21  Visit# / total visits:      Cancels/No Shows: 0    Subjective:  Pt arrives 8 minutes late but able to accommodate. Pt without any new complaints this date. States he has been working consistently on [employer] and it has been going well.  No irritation present in LB upon arrival.   Pain:  [] Yes  [x] No Location: L hip/low back N/A Pain Rating: (0-10 scale) denies/10  Pain altered Tx:  [x] No  [] Yes  Action:  Comments:     Objective:    on MiniBEST today, 1/10 ( on eval)  10MWT: 1.1m/s self selected, 1.43m/s fast (1.04m/s self selected, 1.56 m/s fast on eval, and of note, pt demo'd poor balance/anteropulsive gait at fast speed on eval, which was improved today 1/10)  4+/5 B PF strength  4+/5 B hip ext/abd strength  16% dual cost noted on TUG/TUG cognitive per MiniBEST  5deg R ankle DF PROM, 2deg L    Modalities:   Precautions:  Exercises: Bolded completed 2022  Exercise Reps/ Time Weight/ Level Comments   True bike 5 mn L5    Calf incline board 3x30\"     Calf stair stretch      Calf runner's stretch   demo'd for HEP         Balance      Fwd/retro sway, NBOS   NBOS - narrow base of support   Lateral lean into stepping strategies   Both sides, progressing to no count down and increased lean   Tandem amb w/ verbal fluency    \"as many things as you can say that begin with the letter 'b,d' \"  - slow completion d/t difficulty   Tandem stance w/ head turns, up/down 2x30\" ea   Slow completion d/t difficulty   Heel raises  10x  Eccentric control: no UE support   Foam marches no UE A 20x     L SLS 3x20\"  No UE's   NBOS on foam eyes closed 2x20\"           Gait      Treadmill train 10 min   1.6-2.0 mph CGA, progressing to no UE assist within each speed increase, cues for increased heel strike, large steps, arm swing, posture, staying in middle of treadmill   BIG arm swing and steps  1 lap       BIG steps/arm swing  x1 lap ea  Naming vegetables, fruits, and words beginning with 'B' - 1 lap for each    Faster gait w/ heel strike emphasis, upright posture 3x900 ft  3x150  Second set after treadmill practice; intermittent laps (1 lap = 150')    \" \" w/ cog dual task 7 min total  Counting down from 100 by 3's  - very difficult   Tandem ambulation  30'x2     Retro gait  30'x2  Big steps emphasis   Retro gait w/ head/shoulder turn   Calling out therapist's held numbers         HIIT training      Circuit 1  RPE: 12 1) Sumo squat - 15#, 10x  2) Fwd lunge - AROM, 10x ea  3) Squat with overhead snatch - 5#, 10x ea   Circuit 2  RPE: 14 1) Reach to toes then ceiling - 15x  2) 2 BIG laps   3) Side step w/ arm reach         Posture      Pec minor doorway stretch 2x30\" ea     Mid row 2x10 blue    Shoulder extension  2x10 Lime     Thoracic ext on chair   Against bolster - first set with manual ovp by therapist   Bilateral ER 2x10 AROM    Unilat ER x lime Attempted, too weak   Other:     MiniBEST exam, gait obstacle training, treadmill training w/ armswing, circuit training BLEs/arm swing/trunk rotation, gastroc stretch/strengthening      Treatment Charges: Mins Units   []  Modalities     []  Ther Exercise     []  Manual Therapy     []  Ther Activities     []  Aquatics     []  Vasocompression     [x]  Other: neuro 47 3   Total Treatment time 47 3   RETIAL CHARGES - all neuro d/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 Initiated session on true bike for warm up this date followed by gait training on treadmill while gradually increasing speed. Cueing as needed for increased heel strike, upright posture, large steps, reciprocal arm swing, and maintaining body in center of treadmill as pt tends to migrate towards the L. Focus on large steps and reciprocal arm swing with ambulation around track while naming vegetables, fruits, then words starting with letter 'B. Pt a bit hesitant with steps when he could not think of another word per category. Encouraged pt to continue with proper gait mechanics throughout exercise. Progressed balance activities on/off foam, eyes closed, and w/head movements with taps to bars as needed to maintain balance. Incorporated foam marches with focus on slow, controlled motion to challenge LE strength and stability. Pt demonstrates large steps with retro ambulation without issue. Implemented resisted shoulder extension this date. Pt with good tolerance to exercise program.    [] No change. [x] Other: Provided pt with [employer] [facility] for [employer] for community-level exercise. [x] Gisselle Stallworth is a 58 yo male who presents with higher-level gait, balance, and postural deficits with BUE tremor (L>R), consistent with new diagnosis of Parkinson's disease.  Pt will benefit from skilled physical therapy to address B ankle/hamstring ROM, increase hip strength from mild deficit, postural focus, improve arm swing/heel strike and other gait mechaincs to improve efficiency and safety, and progress gait negotiation of obstacles/dual tasking to improve community safety and increase community participation. Will benefit from continued focus on gait training as well as dual tasking within gait, SLS balance on LLE, and emphasis on BIG movements with BLE/UEs and additional posterior chain/extensor strengthening with remaining therapy visits.       STG: (to be met in 6 treatments) - Assessed on 1/10/22 by Deborah Leblanc, PT:  1. ? Pain: No more than 2/10 max pain in low back after therapy - MET, 2/10 at worst  2. ? ROM: At least 5deg DF PROM bilat to indicate improved ankle mobility for improved gait/stair mechanics at ankle  - Partially met, 5deg R, 2deg L  3. ? Balance: At least 4 point improvement on MiniBEST initial assessment score to indicate progression towards significant improvement across balance systems. 4. ? Strength:   a. At least 4+/5 B ankle PF to allow improved push off with gait to increase efficiency, reduce progression towards festinating gait - MET, 4+/5 bilat  b. At least 5-/5 B hip ext/abd to allow further improved pelvic stability in single limb stance for gait/stair climbing - NOT MET, 4+/5 B hip ext/abd  5. ? Function:   a. Able to ambulate at 1.1m/s with improved B armswing and trunk rotation, and increasing step length and heel strike to indicate improved gait mechanics - MET, 1.1m/s self selected; 1.43m/s fast  b. Able to negotiate head turns, step overs in gait without more than minimal balance treatment/slowed speed - MET, only minimal balance impairment noted very minimally (<5% of the time), but does need cues for heel strike/larger steps/LUE arm swing but this is at baseline as well without head turns  6. Patient to be independent with home exercise program as demonstrated by performance with correct form without cues. - MET  7. Demonstrate Knowledge of fall prevention     LTG: (to be met in 12 treatments)  1.  Ability to Electronically signed by:  Melanie Mcgregor, PTA

## 2022-01-17 ENCOUNTER — HOSPITAL ENCOUNTER (OUTPATIENT)
Dept: PHYSICAL THERAPY | Facility: CLINIC | Age: 62
Setting detail: THERAPIES SERIES
Discharge: HOME | End: 2022-01-17

## 2022-01-17 PROCEDURE — 9900000072 HC NEUROMUSCULAR RE-EDUCATION (SELF-PAY)

## 2022-01-17 NOTE — FLOWSHEET NOTE
[] [facility]) - [facility] &  Therapy  [address]  [phone]  F: [phone] [x] [address]  [employer] [place] [facility]\"" 36   [address]  P: [phone]  F: [phone] [] [address] &  Therapy  [address]  P: [phone]  F: [phone] [] [address]  P: [phone]  F: [phone] [] [address]  [facility]   [address]   [place]: [phone]  F: [phone]      Physical Therapy Daily Treatment Note    Date:  2022  Patient Name:  Yu Manuel    :  1960  MRN: [id number]  Physician: Dr. Karen Bray MD                                    Insurance: RETAIL  Medical Diagnosis: Spinal stenosis of lumbar region with neurogenic claudication; Parkinson's Disease  Rehab Codes: R26.89, M61.81,   Next 's appt.: 21  Date of symptom onset: 21  Visit# / total visits:      Cancels/No Shows: 0    Subjective:  Pt arrives 8 minutes late but able to accommodate. Pt without any new complaints this date. States he has been working consistently on [employer] and it has been going well.  No irritation present in LB upon arrival.   Pain:  [] Yes  [x] No Location: L hip/low back N/A Pain Rating: (0-10 scale) denies/10  Pain altered Tx:  [x] No  [] Yes  Action:  Comments:     Objective:    on MiniBEST today, 1/10 ( on eval)  10MWT: 1.1m/s self selected, 1.43m/s fast (1.04m/s self selected, 1.56 m/s fast on eval, and of note, pt demo'd poor balance/anteropulsive gait at fast speed on eval, which was improved today 1/10)  4+/5 B PF strength  4+/5 B hip ext/abd strength  16% dual cost noted on TUG/TUG cognitive per MiniBEST  5deg R ankle DF PROM, 2deg L    Modalities:   Precautions:  Exercises: Bolded completed 2022  Exercise Reps/ Time Weight/ Level Comments   Treadmill train 1x5 min   1.6-2.0 mph CGA, progressing to no UE assist within each speed increase, cues for increased heel strike, large steps, arm swing, posture, staying in middle of treadmill               Calf incline board 4x30\"     Seated hamstring stretch 2x30\" ea           Balance      Fwd/retro sway, NBOS   NBOS - narrow base of support   Lateral lean into stepping strategies   Both sides, progressing to no count down and increased lean   Tandem amb w/ verbal fluency    \"as many things as you can say that begin with the letter 'b,d' \"  - slow completion d/t difficulty         Foam      Tandem stance w/ head turns, up/down 2x30\" ea   Slow completion d/t difficulty   Heel raises  10x  Eccentric control: no UE support   Foam marches no UE A 20x     L SLS 3x20\"  No UE's   NBOS on foam eyes closed 2x20\"           Gait      Gait w/ cone step overs  10 min  Cones placed to increase step height and step length  - progressed to   BIG arm swing and steps  1 lap       BIG steps/arm swing  x1 lap ea  Naming vegetables, fruits, and words beginning with 'B' - 1 lap for each    Faster gait w/ heel strike emphasis, upright posture 3x900 ft  3x150  Second set after treadmill practice; intermittent laps (1 lap = 150')    \" \" w/ cog dual task 7 min total  Counting down from 100 by 3's  - very difficult   Tandem ambulation  30'x2     Retro gait  30'x2  Big steps emphasis   Retro gait w/ head/shoulder turn   Calling out therapist's held numbers         HIIT training      Circuit 1  RPE: 12 1) Sumo squat - 15#, 10x  2) Fwd lunge - AROM, 10x ea  3) Squat with overhead snatch - 5#, 10x ea   Circuit 2  RPE: 14 1) Reach to toes then ceiling - 15x  2) 2 BIG laps   3) Side step w/ arm reach         Posture      Pec minor doorway stretch 2x30\" ea     Mid row 2x10 blue    Shoulder extension  2x10 Lime     Thoracic ext on chair   Against bolster - first set with manual ovp by therapist   Bilateral ER 2x10 AROM    Unilat ER x lime Attempted, too weak   Other:     MiniBEST exam, gait obstacle training, treadmill training w/ armswing, circuit training BLEs/arm swing/trunk rotation, gastroc stretch/strengthening      Treatment Charges: Mins Units   []  Modalities     []  Ther Exercise     []  Manual Therapy     []  Ther Activities     []  Aquatics     []  Vasocompression     [x]  Other: neuro 50 3   Total Treatment time 50 3   RETIAL CHARGES - all neuro d/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 Initially difficult with treadmill training on L hemibody attempting longer step length, despite max cues and visual feedback. Spent time in calf/hamastring stretching bilat but especially on L for improved flexibility during swing phase of gait and allow more upright posture. Still increased difficulty with treadmill training after that w/ coordination and stepping on LLE - changed to overground training with better tolerance and improved stepping. Added external cues to encourage increased step length with improved carryover noted with level gait training after this intervention. Balance challenges progressed to dynamic visual activity while on compliant surface - challenged with this. Requires min assist to increase pt awareness of excessive body sway and engage postural response more rapidly while in SLS. [] No change. [] Other:     [x] Apolinar Raygoza is a 58 yo male who presents with higher-level gait, balance, and postural deficits with BUE tremor (L>R), consistent with new diagnosis of Parkinson's disease.   Will benefit from continued focus on gait training as well as dual tasking within gait, SLS balance on LLE, and emphasis on BIG movements with BLE/UEs and additional posterior chain/extensor strengthening with remaining therapy visits.       STG: (to be met in 6 treatments) - Assessed on 1/10/22 by José Luis Lawrence, PT:  1. ? Pain: No more than 2/10 max pain in low back after therapy - MET, 2/10 at worst  2. ? ROM: At least 5deg DF PROM bilat to indicate improved ankle mobility for improved gait/stair mechanics at ankle  - Partially met, 5deg R, 2deg L  3. ? Balance: At least 4 point improvement on MiniBEST initial assessment score to indicate progression towards significant improvement across balance systems. 4. ? Strength:   a. At least 4+/5 B ankle PF to allow improved push off with gait to increase efficiency, reduce progression towards festinating gait - MET, 4+/5 bilat  b. At least 5-/5 B hip ext/abd to allow further improved pelvic stability in single limb stance for gait/stair climbing - NOT MET, 4+/5 B hip ext/abd  5. ? Function:   a. Able to ambulate at 1.1m/s with improved B armswing and trunk rotation, and increasing step length and heel strike to indicate improved gait mechanics - MET, 1.1m/s self selected; 1.43m/s fast  b. Able to negotiate head turns, step overs in gait without more than minimal balance treatment/slowed speed - MET, only minimal balance impairment noted very minimally (<5% of the time), but does need cues for heel strike/larger steps/LUE arm swing but this is at baseline as well without head turns  6. Patient to be independent with home exercise program as demonstrated by performance with correct form without cues. - MET  7. Demonstrate Knowledge of fall prevention     LTG: (to be met in 12 treatments)  1. Ability to achieve neutral thoracic/cervical posture without cueing from therapist throughout session  2. At least 5-/5 B ankle PF strength to allow further improved gait mechanics/efficiency, reduce tendency for progression to festinating gait  3. At least 6-point improvement on MiniBEST from initial assessment to indicate clinically significant improvement across balance systems.   4. Able to ambulate at 1.2 m/s with appropriate B armswing and trunk rotation, and increasing step length and consistent heel strike - all without cuing from therapist - to indicate improved gait mechanics  5. Able to negotiate obstacles in gait path including step overs, compliant surfaces without slowed gait speed, good balance throughout        Patient goals: \"balance, know what I need to do\"    Pt. Education:  [x] Yes  [] No  [x] Reviewed Prior HEP/Ed  Method of Education: [x] Verbal  [] Demo  [] Written  Comprehension of Education:  [x] Verbalizes understanding. [] Demonstrates understanding. [] Needs review. [] Demonstrates/verbalizes HEP/Ed previously given. Access Code: [email or web address]  URL: [email or web address]  Date: 12/08/2021  Prepared by: Minoo Severino    Exercises  Seated Thoracic Lumbar Extension - 1-2 x daily - 7 x weekly - 1 sets - 10 reps  Shoulder External Rotation and Scapular Retraction - 1-2 x daily - 7 x weekly - 2 sets - 15 reps  BIG walking with increased step length/arm swing - 5 min/day  Tandem stance with head turns - 2x10 ea  Tandem amb w/ verbal fluency - 3-4 min  Calf stretching on stair or standing -  2x30\" ea  Doorway pec stretch - 3x30\" ea  SLS holds - 4-5x, 20-30\" ea    Plan: [x] Continue current frequency toward long and short term goals.     [x] Specific Instructions for subsequent treatments: SLS holds, tandem amb/gait challenges with dual task, gait training on treadmill with progressive increased speed and upright trunk, bigger steps       Time In: 12:07 pm            Time Out: 12:59 pm     Electronically signed by:  Reyna Barraza PT

## 2022-01-19 ENCOUNTER — HOSPITAL ENCOUNTER (OUTPATIENT)
Dept: PHYSICAL THERAPY | Facility: CLINIC | Age: 62
Discharge: HOME | End: 2022-01-19

## 2022-01-19 ENCOUNTER — HOSPITAL ENCOUNTER (OUTPATIENT)
Age: 62
Setting detail: SPECIMEN
Discharge: HOME | End: 2022-01-19

## 2022-01-19 DIAGNOSIS — E29.1 HYPOGONADISM MALE: ICD-10-CM

## 2022-01-19 LAB
ABSOLUTE EOS #: 0.21 K/UL (ref 0–0.44)
ABSOLUTE IMMATURE GRANULOCYTE: <0.03 K/UL (ref 0–0.3)
ABSOLUTE LYMPH #: 1.12 K/UL (ref 1.1–3.7)
ABSOLUTE MONO #: 0.58 K/UL (ref 0.1–1.2)
BASOPHILS # BLD: 1 % (ref 0–2)
BASOPHILS ABSOLUTE: 0.03 K/UL (ref 0–0.2)
DIFFERENTIAL TYPE: ABNORMAL
EOSINOPHILS RELATIVE PERCENT: 4 % (ref 1–4)
HCT VFR BLD CALC: 45.9 % (ref 40.7–50.3)
HEMOGLOBIN: 15.6 G/DL (ref 13–17)
IMMATURE GRANULOCYTES: 0 %
LYMPHOCYTES # BLD: 20 % (ref 24–43)
MCH RBC QN AUTO: 31.3 PG (ref 25.2–33.5)
MCHC RBC AUTO-ENTMCNC: 34 G/DL (ref 28.4–34.8)
MCV RBC AUTO: 92 FL (ref 82.6–102.9)
MONOCYTES # BLD: 10 % (ref 3–12)
NRBC AUTOMATED: 0 PER 100 WBC
PDW BLD-RTO: 12.4 % (ref 11.8–14.4)
PLATELET # BLD: 171 K/UL (ref 138–453)
PLATELET ESTIMATE: ABNORMAL
PMV BLD AUTO: 10.8 FL (ref 8.1–13.5)
RBC # BLD: 4.99 M/UL (ref 4.21–5.77)
RBC # BLD: ABNORMAL 10*6/UL
SEG NEUTROPHILS: 65 % (ref 36–65)
SEGMENTED NEUTROPHILS ABSOLUTE COUNT: 3.74 K/UL (ref 1.5–8.1)
WBC # BLD: 5.7 K/UL (ref 3.5–11.3)
WBC # BLD: ABNORMAL 10*3/UL

## 2022-01-19 PROCEDURE — 9900000072 HC NEUROMUSCULAR RE-EDUCATION (SELF-PAY)

## 2022-01-19 NOTE — FLOWSHEET NOTE
[] [facility]) [facility] &  Therapy  [address]  [phone]  F: [phone] [x] [address] [facility]   [address]  P: [phone]  F: [phone] [] [address]  [address]  P: [phone]  F: [phone] [] [address]  P: [phone]  F: [phone] [] [address]  [facility]   [address]   [place]: [phone]  F: [phone]      Physical Therapy Daily Treatment Note    Date:  2022  Patient Name:  Amirah Antonio    :  1960  MRN: [id number]  Physician: Dr. Faisal Bowers MD                                    Insurance: RETAIL  Medical Diagnosis: Spinal stenosis of lumbar region with neurogenic claudication; Parkinson's Disease  Rehab Codes: R26.89, M61.81,   Next 's appt.: 21  Date of symptom onset: 21  Visit# / total visits:      Cancels/No Shows:0/0    Subjective:    Pain:  [] Yes  [x] No Location: L hip/low back N/A Pain Rating: (0-10 scale) denies/10  Pain altered Tx:  [x] No  [] Yes  Action:  Comments: Pt arrives denying changes this date. Pt notes he has not looked into knock out parkinson's.      Objective:    on MiniBEST today, 1/10 ( on eval)  10MWT: 1.1m/s self selected, 1.43m/s fast (1.04m/s self selected, 1.56 m/s fast on eval, and of note, pt demo'd poor balance/anteropulsive gait at fast speed on eval, which was improved today 1/10)  4+/5 B PF strength  4+/5 B hip ext/abd strength  16% dual cost noted on TUG/TUG cognitive per MiniBEST  5deg R ankle DF PROM, 2deg L    Modalities:   Precautions:  Exercises: Bolded completed 2022  Exercise Reps/ Time Weight/ Level Comments   Treadmill train 1x5 min   1.6-2.0 mph CGA, progressing to no UE assist within each speed increase, cues for increased heel strike, large steps, arm swing, posture, staying in middle of treadmill               Calf incline board 4x30\"     Seated hamstring stretch 2x30\" ea           Balance      Fwd/retro sway, NBOS   NBOS - narrow base of support   Lateral lean into stepping strategies   Both sides, progressing to no count down and increased lean   Tandem amb w/ verbal fluency    \"as many things as you can say that begin with the letter 'b,d' \"  - slow completion d/t difficulty         Foam      Tandem stance w/ head turns, up/down 2x30\" ea   Slow completion d/t difficulty   Heel raises  10x  Eccentric control: no UE support   Foam marches no UE A 20x     L SLS 3x20\"  No UE's   NBOS on foam eyes closed 2x20\"           Gait      Gait w/ cone step overs  10 min 5x2  consecutive laps with rest break after 5 Cones placed to increase step height and step length  - cones placed for first half of lap   BIG arm swing and steps  1 lap    Naming vegetables, fruits, and words beginning with 'B' - 1 lap for each    BIG steps/arm swing  x1 lap ea  Naming cities   Faster gait w/ heel strike emphasis, upright posture 3x900 ft  3x150  Second set after treadmill practice; intermittent laps (1 lap = 150')    \" \" w/ cog dual task 7 min total  Counting down from 100 by 3's  - very difficult   Tandem ambulation  30'x2     Retro gait  30'x2  Big steps emphasis   Retro gait w/ head/shoulder turn   Calling out therapist's held numbers   Lateral stepping over hurdles 2x  New 1/19   Lateral stepping over hurdles with cone taps - reactive LE 2x  New 1/19   Lateral stepping over hurdles with cone taps- reactive LE/UE 2x  New 1/19   Lateral stepping  overhurdles - reactive directions - R/L 2x  New 1/19   Lateral stepping  overhurdles - reactive directions - R/L and 360 turns  2x  New 1/19         HIIT training      Circuit 1  RPE: 12 1) Sumo squat - 15#, 10x  2) Fwd lunge - AROM, 10x ea  3) Squat with overhead snatch - 5#, 10x ea   Circuit 2  RPE: 14 1) Reach to toes then ceiling - 15x  2) 2 BIG laps   3) Side step w/ arm reach         Posture      Pec minor doorway stretch 2x30\" ea     Mid row 2x10 blue    Shoulder extension  2x10 Lime     Thoracic ext on chair   Against bolster - first set with manual ovp by therapist   Bilateral ER 2x10 AROM    Unilat ER x lime Attempted, too weak   Other:     MiniBEST exam, gait obstacle training, treadmill training w/ armswing, circuit training BLEs/arm swing/trunk rotation, gastroc stretch/strengthening      Treatment Charges: Mins Units   []  Modalities     []  Ther Exercise     []  Manual Therapy     []  Ther Activities     []  Aquatics     []  Vasocompression     [x]  Other: neuro 50 3   Total Treatment time 50 3   RETIAL CHARGES - all neuro d/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 Again continued to begin session with treadmill training. Pt required cueing to increase step length and complete full arm swing with ambulating. Progressed patient with added lateral movements this date over hurdles. Pt required initial cueing to maintain front facing with feet, hips and trunk however with cueing this improved. Added in reactive cone tapping while completing lateral amb. Pt with good tolerance to this task. Pt able to react quickly to directional changes with out LOB. Will continue to progress as able in upcoming sessions. [] No change. [] Other:     [x] Paulette Briseno is a 58 yo male who presents with higher-level gait, balance, and postural deficits with BUE tremor (L>R), consistent with new diagnosis of Parkinson's disease.   Will benefit from continued focus on gait training as well as dual tasking within gait, SLS balance on LLE, and emphasis on BIG movements with BLE/UEs and additional posterior chain/extensor strengthening with remaining therapy visits.       STG: (to be met in 6 treatments) - Assessed on 1/10/22 by Ashley Govea, PT:  1. ? Pain: No more than 2/10 max pain in low back after therapy - MET, 2/10 at worst  2. ? ROM: At least 5deg DF PROM bilat to indicate improved ankle mobility for improved gait/stair mechanics at ankle  - Partially met, 5deg R, 2deg L  3. ? Balance: At least 4 point improvement on MiniBEST initial assessment score to indicate progression towards significant improvement across balance systems. 4. ? Strength:   a. At least 4+/5 B ankle PF to allow improved push off with gait to increase efficiency, reduce progression towards festinating gait - MET, 4+/5 bilat  b. At least 5-/5 B hip ext/abd to allow further improved pelvic stability in single limb stance for gait/stair climbing - NOT MET, 4+/5 B hip ext/abd  5. ? Function:   a. Able to ambulate at 1.1m/s with improved B armswing and trunk rotation, and increasing step length and heel strike to indicate improved gait mechanics - MET, 1.1m/s self selected; 1.43m/s fast  b. Able to negotiate head turns, step overs in gait without more than minimal balance treatment/slowed speed - MET, only minimal balance impairment noted very minimally (<5% of the time), but does need cues for heel strike/larger steps/LUE arm swing but this is at baseline as well without head turns  6. Patient to be independent with home exercise program as demonstrated by performance with correct form without cues. - MET  7. Demonstrate Knowledge of fall prevention     LTG: (to be met in 12 treatments)  1. Ability to achieve neutral thoracic/cervical posture without cueing from therapist throughout session  2. At least 5-/5 B ankle PF strength to allow further improved gait mechanics/efficiency, reduce tendency for progression to festinating gait  3. At least 6-point improvement on MiniBEST from initial assessment to indicate clinically significant improvement across balance systems.   4. Able to ambulate at 1.2 m/s with appropriate B armswing and trunk rotation, and increasing step length and consistent heel strike - all without cuing from therapist - to indicate improved gait mechanics  5. Able to negotiate obstacles in gait path including step overs, compliant surfaces without slowed gait speed, good balance throughout        Patient goals: \"balance, know what I need to do\"    Pt. Education:  [x] Yes  [] No  [x] Reviewed Prior HEP/Ed  Method of Education: [x] Verbal  [] Demo  [] Written  Comprehension of Education:  [x] Verbalizes understanding. [] Demonstrates understanding. [] Needs review. [] Demonstrates/verbalizes HEP/Ed previously given. Access Code: [email or web address]  URL: [email or web address]  Date: 12/08/2021  Prepared by: Maria Eugenia Stallworth    Exercises  Seated Thoracic Lumbar Extension - 1-2 x daily - 7 x weekly - 1 sets - 10 reps  Shoulder External Rotation and Scapular Retraction - 1-2 x daily - 7 x weekly - 2 sets - 15 reps  BIG walking with increased step length/arm swing - 5 min/day  Tandem stance with head turns - 2x10 ea  Tandem amb w/ verbal fluency - 3-4 min  Calf stretching on stair or standing -  2x30\" ea  Doorway pec stretch - 3x30\" ea  SLS holds - 4-5x, 20-30\" ea    Plan: [x] Continue current frequency toward long and short term goals.     [x] Specific Instructions for subsequent treatments: SLS holds, tandem amb/gait challenges with dual task, gait training on treadmill with progressive increased speed and upright trunk, bigger steps       Time In: 202 pm            Time Out: 258 pm     Electronically signed by:  Luis Howe PTA

## 2022-01-20 LAB
ALT SERPL-CCNC: 12 U/L (ref 5–41)
AST SERPL-CCNC: 21 U/L
ESTRADIOL LEVEL: 30 PG/ML (ref 27–52)
PROSTATE SPECIFIC ANTIGEN: 1.07 UG/L
SEX HORMONE BINDING GLOBULIN: 24 NMOL/L (ref 11–80)
TESTOSTERONE FREE-NONMALE: 214.9 PG/ML (ref 47–244)
TESTOSTERONE TOTAL: 799 NG/DL (ref 220–1000)

## 2022-01-24 ENCOUNTER — APPOINTMENT (OUTPATIENT)
Dept: PHYSICAL THERAPY | Facility: CLINIC | Age: 62
End: 2022-01-24

## 2022-01-26 ENCOUNTER — APPOINTMENT (OUTPATIENT)
Dept: PHYSICAL THERAPY | Facility: CLINIC | Age: 62
End: 2022-01-26

## 2022-01-28 ENCOUNTER — HOSPITAL ENCOUNTER (OUTPATIENT)
Dept: PHYSICAL THERAPY | Facility: CLINIC | Age: 62
Setting detail: THERAPIES SERIES
Discharge: HOME | End: 2022-01-28

## 2022-01-28 PROCEDURE — 9900000072 HC NEUROMUSCULAR RE-EDUCATION (SELF-PAY)

## 2022-01-28 PROCEDURE — 97112 NEUROMUSCULAR REEDUCATION: CPT

## 2022-01-28 NOTE — FLOWSHEET NOTE
[] [facility]) [facility] CENTER &  Therapy  [address]  [phone]  F: [phone] [x] [address]  [employer] [address]   [address]  P: [phone]  F: [phone] [] [place]  [address]  P: [phone]  F: [phone] [] [address]  P: [phone]  F: [phone] [] [address]  [facility]   [address]   [place]: [phone]  F: [phone]      Physical Therapy Daily Treatment Note    Date:  2022  Patient Name:  Alonso Andrews    :  1960  MRN: [id number]  Physician: Dr. Danyell Fierro MD                                    Insurance: RETAIL  Medical Diagnosis: Spinal stenosis of lumbar region with neurogenic claudication; Parkinson's Disease  Rehab Codes: R26.89, M61.81,   Next 's appt.: 21  Date of symptom onset: 21  Visit# / total visits: 10/12     Cancels/No Shows:0/0    Subjective:    Pain:  [] Yes  [x] No Location: L hip/low back N/A Pain Rating: (0-10 scale) denies/10  Pain altered Tx:  [x] No  [] Yes  Action:  Comments: Pt arrives reporting typical body aches and pains in L hip and low back.        Objective:    on MiniBEST today, 1/10 ( on eval)  10MWT: 1.1m/s self selected, 1.43m/s fast (1.04m/s self selected, 1.56 m/s fast on eval, and of note, pt demo'd poor balance/anteropulsive gait at fast speed on eval, which was improved today 1/10)  4+/5 B PF strength  4+/5 B hip ext/abd strength  16% dual cost noted on TUG/TUG cognitive per MiniBEST  5deg R ankle DF PROM, 2deg L    Modalities:   Precautions:  Exercises: Bolded completed 2022  Exercise Reps/ Time Weight/ Level Comments   Treadmill train 1x5 min   1.6-2.0 mph CGA, progressing to no UE assist within each speed increase, cues for increased heel strike, large steps, arm swing, posture, staying in middle of treadmill               Calf incline board 4x30\"     Seated hamstring stretch 2x30\" ea           Balance      Fwd/retro sway, NBOS   NBOS - narrow base of support   Lateral lean into stepping strategies   Both sides, progressing to no count down and increased lean   Tandem amb w/ verbal fluency    \"as many things as you can say that begin with the letter 'b,d' \"  - slow completion d/t difficulty         Foam      Tandem stance w/ head turns, up/down 2x30\" ea   Slow completion d/t difficulty   Heel raises  10x  Eccentric control: no UE support   Foam marches no UE A 20x     L SLS 3x20\"  No UE's   NBOS on foam eyes closed 2x20\"           Gait      Gait w/ cone step overs  10 min 5x2  consecutive laps with rest break after 5 Cones placed to increase step height and step length  - cones placed for first half of lap   BIG arm swing and steps  1 lap    Naming vegetables, fruits, and words beginning with 'B' - 1 lap for each    BIG steps/arm swing  x1 lap ea  Naming cities   Faster gait w/ heel strike emphasis, upright posture 3x900 ft  3x150  Second set after treadmill practice; intermittent laps (1 lap = 150')    \" \" w/ cog dual task 7 min total  Counting down from 100 by 3's  - very difficult   Tandem ambulation  30'x2     Retro gait  30'x2  Big steps emphasis   Retro gait w/ head/shoulder turn   Calling out therapist's held numbers   Lateral stepping over hurdles 2x  New 1/19   Lateral stepping over hurdles with cone taps - reactive LE 2x  New 1/19   Lateral stepping over hurdles with cone taps- reactive LE/UE 2x  New 1/19   Lateral stepping  overhurdles - reactive directions - R/L 2x  New 1/19   Lateral stepping  overhurdles - reactive directions - R/L and 360 turns  2x  New 1/19         HIIT training      Circuit 1  RPE: 12 1) Sumo squat - 15#, 10x  2) Fwd lunge - AROM, 10x ea  3) Squat with overhead snatch - 5#, 10x ea   Circuit 2  RPE: 14 1) Reach to toes then ceiling - 15x  2) 2 BIG laps   3) Side step w/ arm reach         Posture      Pec minor doorway stretch 2x30\" ea     Mid row 2x10 blue    Shoulder extension  2x10 Lime     Thoracic ext on chair   Against bolster - first set with manual ovp by therapist   Bilateral ER 2x10 AROM    Unilat ER x lime Attempted, too weak   Other:     MiniBEST exam, gait obstacle training, treadmill training w/ armswing, circuit training BLEs/arm swing/trunk rotation, gastroc stretch/strengthening      Treatment Charges: Mins Units   []  Modalities     []  Ther Exercise     []  Manual Therapy     []  Ther Activities     []  Aquatics     []  Vasocompression     [x]  Other: neuro 40 3   Total Treatment time 40 3   RETIAL CHARGES - all neuro d/t no gait coding for retail     Assessment: [x] Progressing toward goals. Pt continues to require consistent cueing for increased step length and complete full arm swing during all gait interventions. Pt required UE assist at times during tandem stance on foam and SLS to regain stability. Pt demonstrates improve ability to self correct with forward facing feet and trunk during lateral hurdles this date. Will continue to work on dual task gait training and BIG movements. [] No change. [] Other:     [x] Kandis Underwood is a 58 yo male who presents with higher-level gait, balance, and postural deficits with BUE tremor (L>R), consistent with new diagnosis of Parkinson's disease.   Will benefit from continued focus on gait training as well as dual tasking within gait, SLS balance on LLE, and emphasis on BIG movements with BLE/UEs and additional posterior chain/extensor strengthening with remaining therapy visits.       STG: (to be met in 6 treatments) - Assessed on 1/10/22 by Juan Drake, PT:  1. ? Pain: No more than 2/10 max pain in low back after therapy - MET, 2/10 at worst  2. ? ROM: At least 5deg DF PROM bilat to indicate improved ankle mobility for improved gait/stair mechanics at ankle  - Partially met, 5deg R, 2deg L  3. ? Balance: At least 4 point improvement on MiniBEST initial assessment score to indicate progression towards significant improvement across balance systems. 4. ? Strength:   a. At least 4+/5 B ankle PF to allow improved push off with gait to increase efficiency, reduce progression towards festinating gait - MET, 4+/5 bilat  b. At least 5-/5 B hip ext/abd to allow further improved pelvic stability in single limb stance for gait/stair climbing - NOT MET, 4+/5 B hip ext/abd  5. ? Function:   a. Able to ambulate at 1.1m/s with improved B armswing and trunk rotation, and increasing step length and heel strike to indicate improved gait mechanics - MET, 1.1m/s self selected; 1.43m/s fast  b. Able to negotiate head turns, step overs in gait without more than minimal balance treatment/slowed speed - MET, only minimal balance impairment noted very minimally (<5% of the time), but does need cues for heel strike/larger steps/LUE arm swing but this is at baseline as well without head turns  6. Patient to be independent with home exercise program as demonstrated by performance with correct form without cues. - MET  7. Demonstrate Knowledge of fall prevention     LTG: (to be met in 12 treatments)  1. Ability to achieve neutral thoracic/cervical posture without cueing from therapist throughout session  2. At least 5-/5 B ankle PF strength to allow further improved gait mechanics/efficiency, reduce tendency for progression to festinating gait  3. At least 6-point improvement on MiniBEST from initial assessment to indicate clinically significant improvement across balance systems. 4. Able to ambulate at 1.2 m/s with appropriate B armswing and trunk rotation, and increasing step length and consistent heel strike - all without cuing from therapist - to indicate improved gait mechanics  5.  Able to negotiate obstacles in gait path including step overs, compliant

## 2022-02-01 ENCOUNTER — HOSPITAL ENCOUNTER (OUTPATIENT)
Dept: PHYSICAL THERAPY | Facility: CLINIC | Age: 62
Setting detail: THERAPIES SERIES
Discharge: HOME | End: 2022-02-01

## 2022-02-01 PROCEDURE — 9900000072 HC NEUROMUSCULAR RE-EDUCATION (SELF-PAY)

## 2022-02-01 PROCEDURE — 97112 NEUROMUSCULAR REEDUCATION: CPT

## 2022-02-01 NOTE — FLOWSHEET NOTE
[] [facility]) - [facility] CENTER &  Therapy  [address]  [phone]  F: [phone] [x] [address]  [place] [address]   [address]  P: [phone]  F: [phone] [] [place]  [address]  P: [phone]  F: [phone] [] [address]  P: [phone]  F: [phone] [] [address]  [facility]   [address]   [place]: [phone]  F: [phone]      Physical Therapy Daily Treatment Note    Date:  2022  Patient Name:  Heraclio Regalado    :  1960  MRN: [id number]  Physician: Dr. Beatrice Chan MD                                    Insurance: RETAIL  Medical Diagnosis: Spinal stenosis of lumbar region with neurogenic claudication; Parkinson's Disease  Rehab Codes: R26.89, M61.81,   Next 's appt.: 21  Date of symptom onset: 21  Visit# / total visits:      Cancels/No Shows:0/0    Subjective:    Pain:  [x] Yes  [] No Location: L hip/low back N/A Pain Rating: (0-10 scale) (aches and pains)/10  Pain altered Tx:  [x] No  [] Yes  Action:  Comments: Pt states he has been very active around the house and has moderate amounts of aches and pains throughout his body.           Objective:    on MiniBEST today, 1/10 ( on eval)  10MWT: 1.1m/s self selected, 1.43m/s fast (1.04m/s self selected, 1.56 m/s fast on eval, and of note, pt demo'd poor balance/anteropulsive gait at fast speed on eval, which was improved today 1/10)  4+/5 B PF strength  4+/5 B hip ext/abd strength   16% dual cost noted on TUG/TUG cognitive per MiniBEST  5deg R ankle DF PROM, 2deg L    Modalities:   Precautions:  Exercises: Bolded completed 2022  Exercise Reps/ Time Weight/ Level Comments   Treadmill train 1x5 min   1.6-2.0 mph CGA, progressing to no UE assist within each speed increase, cues for increased heel strike, large steps, arm swing, posture, staying in middle of treadmill               Calf incline board 4x30\"     Seated hamstring stretch 2x30\" ea           Balance      Fwd/retro sway, NBOS   NBOS - narrow base of support   Lateral lean into stepping strategies   Both sides, progressing to no count down and increased lean   Tandem amb w/ verbal fluency    \"as many things as you can say that begin with the letter 'b,d' \"  - slow completion d/t difficulty         Foam      Tandem stance w/ head turns, up/down 2x30\" ea   Slow completion d/t difficulty   Heel raises  10x2  Eccentric control: no UE support, inc reps 2/1    Foam marches no UE A 20x     L SLS 3x20\"  No UE's   NBOS on foam eyes closed 2x20\"           Gait      Gait w/ cone step overs  10 min 5x2  consecutive laps with rest break after 5 Cones placed to increase step height and step length  - cones placed for first half of lap   BIG arm swing and steps  1 lap    Naming vegetables, fruits, and words beginning with 'B' - 1 lap for each    BIG steps/arm swing  x1 lap ea  Naming cities   Faster gait w/ heel strike emphasis, upright posture 3x900 ft  3x150  Second set after treadmill practice; intermittent laps (1 lap = 150')    \" \" w/ cog dual task 7 min total  Counting down from 100 by 3's  - very difficult   Marching ambulation  30'x2  Added 2/1   Tandem ambulation  30'x2     Retro gait  30'x2  Big steps emphasis   Retro gait w/ head/shoulder turn   Calling out therapist's held numbers   Lateral stepping over hurdles 2x  New 1/19   Lateral stepping over hurdles with cone taps - reactive LE 2x  New 1/19   Lateral stepping over hurdles with cone taps- reactive LE/UE 2x  New 1/19   Lateral stepping  overhurdles - reactive directions - R/L 2x  New 1/19   Lateral stepping  overhurdles - reactive directions - R/L and 360 turns  2x  New 1/19         HIIT training      Circuit 1  RPE: 12 1) Sumo squat - 15#, 10x  2) Fwd lunge - AROM, 10x ea  3) Squat with overhead snatch - 5#, 10x ea   Circuit 2  RPE: 14 1) Reach to toes then ceiling - 15x  2) 2 BIG laps   3) Side step w/ arm reach         Posture      Pec minor doorway stretch 2x30\" ea     Mid row 2x10 blue    Shoulder extension  2x10 blue  Inc resistance 2/1    Thoracic ext on chair   Against bolster - first set with manual ovp by therapist   Bilateral ER 2x10 lime Added band 2/1    Unilat ER x lime Attempted, too weak   Other:     MiniBEST exam, gait obstacle training, treadmill training w/ armswing, circuit training BLEs/arm swing/trunk rotation, gastroc stretch/strengthening      Treatment Charges: Mins Units   []  Modalities     []  Ther Exercise     []  Manual Therapy     []  Ther Activities     []  Aquatics     []  Vasocompression     [x]  Other: neuro 45 3   Total Treatment time 45 3   RETIAL CHARGES - all neuro d/t no gait coding for retail     Assessment: [x] Progressing toward goals. Initiated treatment with stretching as pt believes this to be beneficial. Pt requires cueing to \" feet\" during ambulation to prevent shuffling and reciprocal arm swing during gait interventions. Pt is able to complete hurdles exercises with very little LOB and difficulty this date. Pt is able to complete shoulder tband strengthening exercises with notable muscular fatigue however, no complaints of pain. [] No change. [] Other:     [x] Benny Molina is a 58 yo male who presents with higher-level gait, balance, and postural deficits with BUE tremor (L>R), consistent with new diagnosis of Parkinson's disease.   Will benefit from continued focus on gait training as well as dual tasking within gait, SLS balance on LLE, and emphasis on BIG movements with BLE/UEs and additional posterior chain/extensor strengthening with remaining therapy visits.       STG: (to be met in 6 treatments) - Assessed on 1/10/22 by Octaviano Arora, PT:  1. ? Pain: No more than 2/10 max pain in low back after therapy - MET, 2/10 at worst  2. ? ROM: At least 5deg DF PROM bilat to indicate improved ankle mobility for improved gait/stair mechanics at ankle  - Partially met, 5deg R, 2deg L  3. ? Balance: At least 4 point improvement on MiniBEST initial assessment score to indicate progression towards significant improvement across balance systems. 4. ? Strength:   a. At least 4+/5 B ankle PF to allow improved push off with gait to increase efficiency, reduce progression towards festinating gait - MET, 4+/5 bilat  b. At least 5-/5 B hip ext/abd to allow further improved pelvic stability in single limb stance for gait/stair climbing - NOT MET, 4+/5 B hip ext/abd  5. ? Function:   a. Able to ambulate at 1.1m/s with improved B armswing and trunk rotation, and increasing step length and heel strike to indicate improved gait mechanics - MET, 1.1m/s self selected; 1.43m/s fast  b. Able to negotiate head turns, step overs in gait without more than minimal balance treatment/slowed speed - MET, only minimal balance impairment noted very minimally (<5% of the time), but does need cues for heel strike/larger steps/LUE arm swing but this is at baseline as well without head turns  6. Patient to be independent with home exercise program as demonstrated by performance with correct form without cues. - MET  7. Demonstrate Knowledge of fall prevention     LTG: (to be met in 12 treatments)  1. Ability to achieve neutral thoracic/cervical posture without cueing from therapist throughout session  2. At least 5-/5 B ankle PF strength to allow further improved gait mechanics/efficiency, reduce tendency for progression to festinating gait  3. At least 6-point improvement on MiniBEST from initial assessment to indicate clinically significant improvement across balance systems.   4. Able to ambulate at 1.2 m/s with appropriate B armswing and trunk rotation, and increasing step length and consistent heel strike - all without cuing from therapist - to indicate improved gait mechanics  5. Able to negotiate obstacles in gait path including step overs, compliant surfaces without slowed gait speed, good balance throughout        Patient goals: \"balance, know what I need to do\"    Pt. Education:  [x] Yes  [] No  [] Reviewed Prior HEP/Ed  Method of Education: [x] Verbal  [] Demo  [] Written  Comprehension of Education:  [x] Verbalizes understanding. [x] Demonstrates understanding. [] Needs review. [] Demonstrates/verbalizes HEP/Ed previously given. Access Code: [email or web address]  URL: [email or web address]  Date: 12/08/2021  Prepared by: Brittany Barksdale    Exercises  Seated Thoracic Lumbar Extension - 1-2 x daily - 7 x weekly - 1 sets - 10 reps  Shoulder External Rotation and Scapular Retraction - 1-2 x daily - 7 x weekly - 2 sets - 15 reps  BIG walking with increased step length/arm swing - 5 min/day  Tandem stance with head turns - 2x10 ea  Tandem amb w/ verbal fluency - 3-4 min  Calf stretching on stair or standing -  2x30\" ea  Doorway pec stretch - 3x30\" ea  SLS holds - 4-5x, 20-30\" ea    Plan: [x] Continue current frequency toward long and short term goals.     [x] Specific Instructions for subsequent treatments: SLS holds, tandem amb/gait challenges with dual task, gait training on treadmill with progressive increased speed and upright trunk, bigger steps       Time In: 9:45 am            Time Out: 10:30 am     Electronically signed by:  Naima Wilson PTA

## 2022-02-04 ENCOUNTER — APPOINTMENT (OUTPATIENT)
Dept: PHYSICAL THERAPY | Facility: CLINIC | Age: 62
End: 2022-02-04

## 2022-02-09 ENCOUNTER — HOSPITAL ENCOUNTER (OUTPATIENT)
Dept: PHYSICAL THERAPY | Facility: CLINIC | Age: 62
Setting detail: THERAPIES SERIES
Discharge: HOME | End: 2022-02-09

## 2022-02-09 PROCEDURE — 9900000067 HC THERAPEUTIC EXERCISE EA 15 MINS (SELF-PAY)

## 2022-02-09 PROCEDURE — 9900000072 HC NEUROMUSCULAR RE-EDUCATION (SELF-PAY)

## 2022-02-09 NOTE — FLOWSHEET NOTE
Mini-BESTest    Anticipatory  1. SIT TO STAND    SUBSCORE: 6/6  Instruction: \"Cross your arms across your chest. Try not to use your hands unless you must. Do not let your legs lean against the back of the chair when you stand. Please stand up now. \"  (2) Normal: Comes to stand without use of hands and stabilizes independently. (1) Moderate: Comes to stand WITH use of hands on first attempt. (0) Severe: Unable to stsand up from chair without assistance, OR needs several attempts with use of hands. 2. RISE TO TOES  Instruction: \"Place your feet shoulder width apart. Place your hands on your hips. Try to rise as high as you can onto your toes. I will count out loud to 3 seconds. Try to hold this pose for at least 3 seconds. Look straight ahead. Rise now. \"  (2) Normal: Stable for 3s with maximum height.  (1) Moderate: Heels up, but not full range (smaller than when holding hands) OR noticeable instability for 3s  (0) Severe: < or = 3s.    3. STAND ON ONE LEG  Instruction: \"Look straight ahead. Keep your hands on your hips. Lift your leg off of the ground behind you without touching or resting your raised leg upon your other standing leg. Stay standing on one leg as long as you can. Look straight ahead. Lift now. Left: Time in Seconds:     Trial 1:20s Trial 2:20s  (2) Normal: 20s  (1) Moderate: <20s  (0) Severe: Unable    Right: Time in Seconds:    Trial 1:20s   Trial 20s  (2) Normal: 20s  (1) Moderate: <20s  (0) Severe: Unable        Reactive Postural Control   SUBSCORE: 4/6  4.   COMPENSATORY STEPPING CORRECTION - FORWARD  Instruction: \"Stand with your feet shoulder width apart, arms at your sides. Lean forward against my hands beyond your forward limits. When I let go, do whatever is necessary including taking a step, to avoid a fall. \"  (2) Normal: Recovers independently with a single, large step (second realignment step is allowed)  (1) Moderate: More than one step used to recover equilibrium.  (0) Severe:  No step, OR would fall if not caught, OR falls spontaneously    5. COMPENSATORY STEPPING CORRECTION - BACKWARD  Instruction: \"Stand with your feet shoulder width apart, arms at your sides. Lean backward against my hands beyond your backward limits. When I let go, do whatever is necessary, including taking a step, to avoid a fall. \"  (2) Normal: Recovers independently with a single, large step (second realignment step is allowed)  (1) Moderate: More than one step used to recover equilibrium.  (0) Severe: No step, OR would fall if not caught, OR falls spontaneously    6. COMPENSATORY STEPPING CORRECTION - LATERAL  Instruction: \"Stand with your feet together, arms down at your sides. Lean into my hand beyond your sideways limit. When I let go, do whatever is necessary, including taking a step, to avoid a fall\". Left        Right  (2) Normal: Recovers independently with 1 step   (2) Normal: Recovers independently with 1 step   (1) Moderate: Several Steps to recover equilibrium  (1) Moderate: Several Steps to recover equilibrium  (0) Severe: Falls, or cannot step    (0) Severe: Falls, or cannot step  Use the side with the lowest score to calculate sub-score and total score. Sensory Orientation    SUBSCORE: 6/6  7. STANCE (FEET TOGETHER); EYES OPEN, FIRM SURFACE  Instruction: \"Place your hands on your hips. Place your feet together until almost touching. Look straight ahead. Be as stable and still as possible, until I say stop. \"  Time in seconds: 30s  (2) Normal: 30s  (1) Moderate: <30s  (0) Severe: Unable    8. STANCE (FEET TOGETHER; EYES CLOSED, FOAM SURFACE  Instruction: \"Step onto the foam. Place your hands on your hips. Place your feet together until almost touching. Be as stable and as still as possible, until I say stop. I will start timing when you close your eyes\". Time in seconds: 30s  (2) Normal: 30s  (1) Moderate: <30s  (0) Severe: Unable    9.    INCLINE - EYES CLOSED  Instruction: \"Step onto the balance. (1) Moderate: Steps over box but touches box OR displays cautious behavior by slowing gait. (0) Severe: Unable to step over box OR steps around box    14. TIMED UP & GO WITH DUAL TASK (3 METER WALK)  Instruction TUG: \"When I say \"Go\", stand up from the chair, walk at your normal speed across the tape on the floor, turn around, and come back to sit in the chair. \"  Instruction TUG with Dual Task: \"Count backwards by threes starting at ____. When I say \"Go\", stand up from the chair, walk your normal speed across the tape on the floor, turn around, and come back to sit in the chair. Continue counting backwards the entire time. TU.97 seconds;  Dual Task TU.55 seconds  (2) Normal: No noticeable change in sitting, standing, or walking while backward counting when compared to TUG without dual Task. (1) Moderate: Dual task affects either counting OR walking (>10%) when compared to the TUG without Dual Task  (0) Severe: Stops counting while walking OR stops walking while counting  When scoring item 14, if subject's gait speed slows more than 10% between the TUG without and with a Dual Task the score should be decreased by a point.        TOTAL SCORE: 26/28

## 2022-02-09 NOTE — DISCHARGE SUMMARY
[] [facility]) - [facility] &  Therapy  [address]  [phone]  F: [phone] [x] [address]  [facility]   [address]  P: [phone]  F: [phone] [] [address] &  Therapy  [address]  P: [phone]  F: [phone] [] [address]  P: [phone]  F: [phone] [] [address]  [place]   [address]   [place]: [phone]  F: [phone]      Physical Therapy Discharge Note    Date: 2022      Patient: Travis Velasco  : 1960  MRN: [id number]    Physician: Dr. Lizette Jimenez MD                                    Insurance: RETAIL  Medical Diagnosis: Spinal stenosis of lumbar region with neurogenic claudication; Parkinson's Disease  Rehab Codes: R26.89, M61.81,   Next 's appt.: 21  Date of symptom onset: 21  Visit# / total visits:                                 Cancels/No Shows:0/0  Date of initial visit: 21                Date of final visit: 22      Subjective:  Pain:  [x]? Yes  []? No   Location: L hip/low back N/A  Pain Rating: (0-10 scale) (aches and pains)/10  Pain altered Tx:  [x]? No  []? Yes  Action:  Comments: Pt arrives 12 min late, notes things have been going well. Objective:  Test Measurements: see goal assessment below  Function: see goal assessment below    Assessment:  [x]? Progressing toward goals. Pt has made significant progress in gait speed and mechanics, though some difficulty with carryover if not cognitively thinking about mechanics. Has demonstrated good progress with anticipatory balance and postural stability on unlevel surfaces, as well as gait stability with variable challenges including step overs, foam, and quick turns.  Pt has met most goals at this assessment - PF strength still limited but pt to complete independently at home - spent time in new HEP review and pt demo, with all questions answered to pt satisfaction. STG: (to be met in 6 treatments) - Assessed on 1/10/22 by Nain Murphy, PT:  1. ? Pain: No more than 2/10 max pain in low back after therapy - MET, 2/10 at worst  2. ? ROM: At least 5deg DF PROM bilat to indicate improved ankle mobility for improved gait/stair mechanics at ankle  - Partially met, 5deg R, 2deg L  3. ? Balance: At least 4 point improvement on MiniBEST initial assessment score to indicate progression towards significant improvement across balance systems. 4. ? Strength:   a. At least 4+/5 B ankle PF to allow improved push off with gait to increase efficiency, reduce progression towards festinating gait - MET, 4+/5 bilat  b. At least 5-/5 B hip ext/abd to allow further improved pelvic stability in single limb stance for gait/stair climbing - NOT MET, 4+/5 B hip ext/abd  5. ? Function:   a. Able to ambulate at 1.1m/s with improved B armswing and trunk rotation, and increasing step length and heel strike to indicate improved gait mechanics - MET, 1.1m/s self selected; 1.43m/s fast  b. Able to negotiate head turns, step overs in gait without more than minimal balance treatment/slowed speed - MET, only minimal balance impairment noted very minimally (<5% of the time), but does need cues for heel strike/larger steps/LUE arm swing but this is at baseline as well without head turns  6. Patient to be independent with home exercise program as demonstrated by performance with correct form without cues. - MET  7. Demonstrate Knowledge of fall prevention     LTG: (to be met in 12 treatments) - Assessed by Nain Murphy, PT on 2/9/22:  1. Ability to achieve neutral thoracic/cervical posture without cueing from therapist throughout session - Made progress, able to achieve upright but still needs cuing to achieve this  2.  At least 5-/5 B ankle PF strength to allow further improved gait mechanics/efficiency, reduce tendency for progression to festinating gait - NOT MET, 4+/5 bilat PFs   3. At least 6-point improvement on MiniBEST from initial assessment to indicate clinically significant improvement across balance systems. - Made progress, 26/28 (was at 23/28)  4. Able to ambulate at 1.2 m/s with appropriate B armswing and trunk rotation, and increasing step length and consistent heel strike - all without cuing from therapist - to indicate improved gait mechanics - MET, 1.28 m/s self selected; 1.62 m/s fast  5. Able to negotiate obstacles in gait path including step overs, compliant surfaces without slowed gait speed, good balance throughout - MET       Treatment to Date:  [x] Therapeutic Exercise    [] Modalities:  [] Therapeutic Activity    [] Ultrasound  [] Electrical Stimulation  [x] Gait Training     [] Massage       [] Lumbar/Cervical Traction  [x] Neuromuscular Re-education [] Cold/hotpack [] Iontophoresis: 4 mg/mL  [x] Instruction in Home Exercise Program                     Dexamethasone Sodium  [] Manual Therapy             Phosphate 40-80 mAmin  [] Aquatic Therapy                   [] Vasocompression/    [] Other:             Game Ready    Discharge Status:     [] Pt recovered from conditions. Treatment goals were met. [x] Pt received maximum benefit. No further therapy indicated at this time. [x] Pt to continue exercise/home instructions independently. [] Therapy interrupted due to:    [] Pt has 2 or more no shows/cancels, is discontinued per our policy. [x] Pt has completed prescribed number of treatment sessions. [] Other:         Electronically signed by Brandt Correa PT on 2/9/2022 at 12:38 PM      If you have any questions or concerns, please don't hesitate to call.   Thank you for your referral.

## 2022-02-09 NOTE — FLOWSHEET NOTE
[] [facility]) [facility] &  Therapy  [address]  [phone]  F: [phone] [x] [address]  [place]   [address]  P: [phone]  F: [phone] [] [place]  [address]  P: [phone]  F: [phone] [] [address]  P: [phone]  F: [phone] [] [address]  [facility]   [address]   [place]: [phone]  F: [phone]      Physical Therapy Daily Treatment Note    Date:  2022  Patient Name:  Heraclio Regalado    :  1960  MRN: [id number]  Physician: Dr. Beatrice Chan MD                                    Insurance: RETAIL  Medical Diagnosis: Spinal stenosis of lumbar region with neurogenic claudication; Parkinson's Disease  Rehab Codes: R26.89, M61.81,   Next 's appt.: 21  Date of symptom onset: 21  Visit# / total visits:      Cancels/No Shows:0/0    Subjective:    Pain:  [x] Yes  [] No Location: L hip/low back N/A Pain Rating: (0-10 scale) (aches and pains)/10  Pain altered Tx:  [x] No  [] Yes  Action:  Comments: Pt arrives 12 min late, notes things have been going well.         Objective:    on MiniBEST today, 1/10 ( on eval)  10MWT: 1.1m/s self selected, 1.43m/s fast (1.04m/s self selected, 1.56 m/s fast on eval, and of note, pt demo'd poor balance/anteropulsive gait at fast speed on eval, which was improved today 1/10)  4+/5 B PF strength  4+/5 B hip ext/abd strength   16% dual cost noted on TUG/TUG cognitive per MiniBEST  5deg R ankle DF PROM, 2deg L    Modalities:   Precautions:  Exercises: Bolded completed 2022 - limited exercises completed d/t LTG assessment including MiniBEST test and 10MWT  Exercise Reps/ Time Weight/ Level Comments   Treadmill train 1x10 min   1.6-2.0 mph CGA, progressing to no UE assist within each speed increase, cues for increased heel strike, large steps, arm swing, posture, staying in middle of treadmill               Calf incline board 4x30\"     Seated hamstring stretch 2x30\" ea           Balance      Fwd/retro sway, NBOS   NBOS - narrow base of support   Lateral lean into stepping strategies   Both sides, progressing to no count down and increased lean   Tandem amb w/ verbal fluency    \"as many things as you can say that begin with the letter 'b,d' \"  - slow completion d/t difficulty         Foam      Tandem stance w/ head turns, up/down 2x30\" ea   Slow completion d/t difficulty   Heel raises  10x2  Eccentric control: no UE support, inc reps 2/1    Foam marches no UE A 20x     L SLS 3x20\"  No UE's   NBOS on foam eyes closed 2x20\"           Gait      Gait w/ cone step overs  10 min 5x2  consecutive laps with rest break after 5 Cones placed to increase step height and step length  - cones placed for first half of lap   BIG arm swing and steps  1 lap    Naming vegetables, fruits, and words beginning with 'B' - 1 lap for each    BIG steps/arm swing  x1 lap ea  Naming cities   Faster gait w/ heel strike emphasis, upright posture 3x900 ft  3x150  Second set after treadmill practice; intermittent laps (1 lap = 150')    \" \" w/ cog dual task 7 min total  Counting down from 100 by 3's  - very difficult   Marching ambulation  30'x2  Added 2/1   Tandem ambulation  30'x2     Retro gait  30'x2  Big steps emphasis   Retro gait w/ head/shoulder turn   Calling out therapist's held numbers   Lateral stepping over hurdles 2x  New 1/19   Lateral stepping over hurdles with cone taps - reactive LE 2x  New 1/19   Lateral stepping over hurdles with cone taps- reactive LE/UE 2x  New 1/19   Lateral stepping  overhurdles - reactive directions - R/L 2x  New 1/19   Lateral stepping  overhurdles - reactive directions - R/L and 360 turns  2x  New 1/19         HIIT training Circuit 1  RPE: 12 1) Sumo squat - 15#, 10x  2) Fwd lunge - AROM, 10x ea  3) Squat with overhead snatch - 5#, 10x ea   Circuit 2  RPE: 14 1) Reach to toes then ceiling - 15x  2) 2 BIG laps   3) Side step w/ arm reach         Posture      Pec minor doorway stretch x  Demo'd for HEP   Mid row 15x purple    Lat pull down 15x blue  Inc resistance 2/1    Thoracic ext on chair 3x  Therapist ovp   Bilateral ER x purple demo'd for HEP   Unilat ER x lime Attempted, too weak   Other: Significant time spent in LTG assessment and PT education on progress, typical impairments with PD and preventative strategies - billed with neuro re-ed and therex as appropriate    MiniBEST exam, gait obstacle training, treadmill training w/ armswing, circuit training BLEs/arm swing/trunk rotation, gastroc stretch/strengthening      Treatment Charges: Mins Units   []  Modalities     [x]  Ther Exercise 22 2   []  Manual Therapy     []  Ther Activities     []  Aquatics     []  Vasocompression     [x]  Other: neuro 20 1   Total Treatment time 42 3   RETIAL CHARGES - all gait as neuro d/t no gait coding for retail     Assessment: [x] Progressing toward goals. Pt has made significant progress in gait speed and mechanics, though some difficulty with carryover if not cognitively thinking about mechanics. Has demonstrated good progress with anticipatory balance and postural stability on unlevel surfaces, as well as gait stability with variable challenges including step overs, foam, and quick turns. Pt has met most goals at this assessment - PF strength still limited but pt to complete independently at home - spent time in new HEP review and pt demo, with no questions after. [] No change.       [] Other:     [x] Discharge       STG: (to be met in 6 treatments) - Assessed on 1/10/22 by Curt Lesches, PT:  1. ? Pain: No more than 2/10 max pain in low back after therapy - MET, 2/10 at worst  2. ? ROM: At least 5deg DF PROM bilat to indicate improved ankle mobility for improved gait/stair mechanics at ankle  - Partially met, 5deg R, 2deg L  3. ? Balance: At least 4 point improvement on MiniBEST initial assessment score to indicate progression towards significant improvement across balance systems. 4. ? Strength:   a. At least 4+/5 B ankle PF to allow improved push off with gait to increase efficiency, reduce progression towards festinating gait - MET, 4+/5 bilat  b. At least 5-/5 B hip ext/abd to allow further improved pelvic stability in single limb stance for gait/stair climbing - NOT MET, 4+/5 B hip ext/abd  5. ? Function:   a. Able to ambulate at 1.1m/s with improved B armswing and trunk rotation, and increasing step length and heel strike to indicate improved gait mechanics - MET, 1.1m/s self selected; 1.43m/s fast  b. Able to negotiate head turns, step overs in gait without more than minimal balance treatment/slowed speed - MET, only minimal balance impairment noted very minimally (<5% of the time), but does need cues for heel strike/larger steps/LUE arm swing but this is at baseline as well without head turns  6. Patient to be independent with home exercise program as demonstrated by performance with correct form without cues. - MET  7. Demonstrate Knowledge of fall prevention     LTG: (to be met in 12 treatments) - Assessed by Simone Heimlich, PT on 2/9/22:  1. Ability to achieve neutral thoracic/cervical posture without cueing from therapist throughout session - Made progress, able to achieve upright but still needs cuing to achieve this  2. At least 5-/5 B ankle PF strength to allow further improved gait mechanics/efficiency, reduce tendency for progression to festinating gait - NOT MET, 4+/5 bilat PFs   3. At least 6-point improvement on MiniBEST from initial assessment to indicate clinically significant improvement across balance systems. - Made progress, 26/28 (was at 23/28)  4.  Able to ambulate at 1.2 m/s with appropriate B armswing and trunk rotation, and increasing step length and consistent heel strike - all without cuing from therapist - to indicate improved gait mechanics - MET, 1.28 m/s self selected; 1.62 m/s fast  5. Able to negotiate obstacles in gait path including step overs, compliant surfaces without slowed gait speed, good balance throughout - MET        Patient goals: \"balance, know what I need to do\"    Pt. Education:  [x] Yes  [] No  [] Reviewed Prior HEP/Ed  Method of Education: [x] Verbal  [] Demo  [] Written  Comprehension of Education:  [x] Verbalizes understanding. [x] Demonstrates understanding. [] Needs review. [] Demonstrates/verbalizes HEP/Ed previously given. Access Code: [email or web address]  URL: [employer]/  Date: 12/08/2021  Prepared by: Catina Menjivar    Exercises  Seated Thoracic Lumbar Extension - 1-2 x daily - 7 x weekly - 1 sets - 10 reps  Shoulder External Rotation and Scapular Retraction - 1-2 x daily - 7 x weekly - 2 sets - 15 reps  BIG walking with increased step length/arm swing - 5 min/day  Tandem stance with head turns - 2x10 ea  Tandem amb w/ verbal fluency - 3-4 min  Calf stretching on stair or standing -  2x30\" ea  Doorway pec stretch - 3x30\" ea  SLS holds - 4-5x, 20-30\" ea    Access Code: [email or web address]  URL: [employer]/  Date: 02/09/2022  Prepared by: Catina Menjivar    Exercises  Lat Pull Down - 1 x daily - 2 x weekly - 3 sets - 10-15 reps  Standing Shoulder Row with Anchored Resistance - 1 x daily - 2-3 x weekly - 3 sets - 15 reps  Lunge with Counter Support - 1 x daily - 2-3 x weekly - 3 sets - 10 reps  Seated Thoracic Lumbar Extension - 1 x daily - 3-4 x weekly - 3 sets - 10 reps  Standing Heel Raise - 1 x daily - 7 x weekly - 3 sets - 15-20 reps  Gastroc Stretch on Step - 1 x daily - 7 x weekly - 3 reps - 30s hold  Shoulder External Rotation and Scapular Retraction with Resistance - 1 x daily - 7 x weekly - 3 sets - 15 reps  Doorway Pec stretch - 1 x daily - 7 x weekly - 3 reps - 30 sec hold      Plan: [x] Discharge      Time In: 11:13 am            Time Out: 12:03 pm     Electronically signed by:  Tyshawn Frederick PT

## 2022-05-24 ENCOUNTER — HOSPITAL ENCOUNTER (OUTPATIENT)
Age: 62
Setting detail: SPECIMEN
Discharge: HOME | End: 2022-05-24

## 2022-05-24 DIAGNOSIS — E29.1 HYPOGONADISM MALE: ICD-10-CM

## 2022-05-24 LAB
ABSOLUTE EOS #: 0.28 K/UL (ref 0–0.44)
ABSOLUTE IMMATURE GRANULOCYTE: <0.03 K/UL (ref 0–0.3)
ABSOLUTE LYMPH #: 0.99 K/UL (ref 1.1–3.7)
ABSOLUTE MONO #: 0.47 K/UL (ref 0.1–1.2)
ALT SERPL-CCNC: 9 U/L (ref 5–41)
AST SERPL-CCNC: 23 U/L
BASOPHILS # BLD: 1 % (ref 0–2)
BASOPHILS ABSOLUTE: 0.03 K/UL (ref 0–0.2)
EOSINOPHILS RELATIVE PERCENT: 6 % (ref 1–4)
ESTRADIOL LEVEL: 17.7 PG/ML (ref 27–52)
HCT VFR BLD CALC: 48.4 % (ref 40.7–50.3)
HEMOGLOBIN: 16.7 G/DL (ref 13–17)
IMMATURE GRANULOCYTES: 0 %
LYMPHOCYTES # BLD: 20 % (ref 24–43)
MCH RBC QN AUTO: 31.3 PG (ref 25.2–33.5)
MCHC RBC AUTO-ENTMCNC: 34.5 G/DL (ref 28.4–34.8)
MCV RBC AUTO: 90.8 FL (ref 82.6–102.9)
MONOCYTES # BLD: 10 % (ref 3–12)
NRBC AUTOMATED: 0 PER 100 WBC
PDW BLD-RTO: 12.6 % (ref 11.8–14.4)
PLATELET # BLD: 168 K/UL (ref 138–453)
PMV BLD AUTO: 10.7 FL (ref 8.1–13.5)
PROSTATE SPECIFIC ANTIGEN: 1.09 NG/ML
RBC # BLD: 5.33 M/UL (ref 4.21–5.77)
SEG NEUTROPHILS: 63 % (ref 36–65)
SEGMENTED NEUTROPHILS ABSOLUTE COUNT: 3.18 K/UL (ref 1.5–8.1)
SEX HORMONE BINDING GLOBULIN: 25 NMOL/L (ref 11–80)
TESTOSTERONE FREE-NONMALE: 94.7 PG/ML (ref 47–244)
TESTOSTERONE TOTAL: 400 NG/DL (ref 220–1000)
WBC # BLD: 5 K/UL (ref 3.5–11.3)

## 2022-09-27 ENCOUNTER — HOSPITAL ENCOUNTER (OUTPATIENT)
Age: 62
Setting detail: SPECIMEN
Discharge: HOME | End: 2022-09-27

## 2022-09-27 DIAGNOSIS — E29.1 HYPOGONADISM MALE: ICD-10-CM

## 2022-09-27 LAB
ABSOLUTE EOS #: 0.29 K/UL (ref 0–0.44)
ABSOLUTE IMMATURE GRANULOCYTE: <0.03 K/UL (ref 0–0.3)
ABSOLUTE LYMPH #: 1.15 K/UL (ref 1.1–3.7)
ABSOLUTE MONO #: 0.54 K/UL (ref 0.1–1.2)
ALT SERPL-CCNC: 12 U/L (ref 5–41)
AST SERPL-CCNC: 21 U/L
BASOPHILS # BLD: 1 % (ref 0–2)
BASOPHILS ABSOLUTE: 0.03 K/UL (ref 0–0.2)
EOSINOPHILS RELATIVE PERCENT: 6 % (ref 1–4)
HCT VFR BLD CALC: 46 % (ref 40.7–50.3)
HEMOGLOBIN: 15.5 G/DL (ref 13–17)
IMMATURE GRANULOCYTES: 0 %
LYMPHOCYTES # BLD: 24 % (ref 24–43)
MCH RBC QN AUTO: 31 PG (ref 25.2–33.5)
MCHC RBC AUTO-ENTMCNC: 33.7 G/DL (ref 28.4–34.8)
MCV RBC AUTO: 92 FL (ref 82.6–102.9)
MONOCYTES # BLD: 11 % (ref 3–12)
NRBC AUTOMATED: 0 PER 100 WBC
PDW BLD-RTO: 13.2 % (ref 11.8–14.4)
PLATELET # BLD: 154 K/UL (ref 138–453)
PMV BLD AUTO: 10.6 FL (ref 8.1–13.5)
PROSTATE SPECIFIC ANTIGEN: 1.35 NG/ML
RBC # BLD: 5 M/UL (ref 4.21–5.77)
SEG NEUTROPHILS: 58 % (ref 36–65)
SEGMENTED NEUTROPHILS ABSOLUTE COUNT: 2.72 K/UL (ref 1.5–8.1)
SEX HORMONE BINDING GLOBULIN: 33 NMOL/L (ref 11–80)
TESTOSTERONE FREE-NONMALE: 58.6 PG/ML (ref 47–244)
TESTOSTERONE TOTAL: 298 NG/DL (ref 220–1000)
WBC # BLD: 4.8 K/UL (ref 3.5–11.3)

## 2022-09-28 LAB — ESTRADIOL LEVEL: 13.6 PG/ML (ref 27–52)

## 2023-02-13 ENCOUNTER — HOSPITAL ENCOUNTER (OUTPATIENT)
Age: 63
Setting detail: SPECIMEN
Discharge: HOME | End: 2023-02-13

## 2023-02-13 DIAGNOSIS — E29.1 HYPOGONADISM MALE: ICD-10-CM

## 2023-02-13 LAB
ABSOLUTE EOS #: 0.23 K/UL (ref 0–0.44)
ABSOLUTE IMMATURE GRANULOCYTE: <0.03 K/UL (ref 0–0.3)
ABSOLUTE LYMPH #: 1 K/UL (ref 1.1–3.7)
ABSOLUTE MONO #: 0.51 K/UL (ref 0.1–1.2)
ALT SERPL-CCNC: 8 U/L (ref 5–41)
AST SERPL-CCNC: 20 U/L
BASOPHILS # BLD: 1 % (ref 0–2)
BASOPHILS ABSOLUTE: 0.03 K/UL (ref 0–0.2)
EOSINOPHILS RELATIVE PERCENT: 4 % (ref 1–4)
ESTRADIOL LEVEL: 32.8 PG/ML (ref 27–52)
HCT VFR BLD AUTO: 46.6 % (ref 40.7–50.3)
HGB BLD-MCNC: 16.1 G/DL (ref 13–17)
IMMATURE GRANULOCYTES: 0 %
LYMPHOCYTES # BLD: 19 % (ref 24–43)
MCH RBC QN AUTO: 31.4 PG (ref 25.2–33.5)
MCHC RBC AUTO-ENTMCNC: 34.5 G/DL (ref 28.4–34.8)
MCV RBC AUTO: 91 FL (ref 82.6–102.9)
MONOCYTES # BLD: 10 % (ref 3–12)
NRBC AUTOMATED: 0 PER 100 WBC
PDW BLD-RTO: 12.7 % (ref 11.8–14.4)
PLATELET # BLD AUTO: 159 K/UL (ref 138–453)
PMV BLD AUTO: 11 FL (ref 8.1–13.5)
PROSTATE SPECIFIC ANTIGEN: 1.42 NG/ML
RBC # BLD: 5.12 M/UL (ref 4.21–5.77)
SEG NEUTROPHILS: 66 % (ref 36–65)
SEGMENTED NEUTROPHILS ABSOLUTE COUNT: 3.42 K/UL (ref 1.5–8.1)
SHBG SERPL-SCNC: 26 NMOL/L (ref 11–80)
TESTOST FREE MFR SERPL: 140 PG/ML (ref 47–244)
TESTOST SERPL-MCNC: 573 NG/DL (ref 220–1000)
WBC # BLD AUTO: 5.2 K/UL (ref 3.5–11.3)

## 2023-07-24 ENCOUNTER — HOSPITAL ENCOUNTER (OUTPATIENT)
Age: 63
Setting detail: SPECIMEN
Discharge: HOME | End: 2023-07-24

## 2023-07-24 DIAGNOSIS — E29.1 HYPOGONADISM MALE: ICD-10-CM

## 2023-07-24 LAB
ALT SERPL-CCNC: 19 U/L (ref 5–41)
AST SERPL-CCNC: 25 U/L
BASOPHILS # BLD: 0.03 K/UL (ref 0–0.2)
BASOPHILS NFR BLD: 1 % (ref 0–2)
EOSINOPHIL # BLD: 0.23 K/UL (ref 0–0.44)
EOSINOPHILS RELATIVE PERCENT: 4 % (ref 1–4)
ERYTHROCYTE [DISTWIDTH] IN BLOOD BY AUTOMATED COUNT: 11.9 % (ref 11.8–14.4)
ESTRADIOL LEVEL: <5 PG/ML (ref 27–52)
HCT VFR BLD AUTO: 46.7 % (ref 40.7–50.3)
HGB BLD-MCNC: 16.7 G/DL (ref 13–17)
IMM GRANULOCYTES # BLD AUTO: <0.03 K/UL (ref 0–0.3)
IMM GRANULOCYTES NFR BLD: 0 %
LYMPHOCYTES NFR BLD: 1.21 K/UL (ref 1.1–3.7)
LYMPHOCYTES RELATIVE PERCENT: 23 % (ref 24–43)
MCH RBC QN AUTO: 31.9 PG (ref 25.2–33.5)
MCHC RBC AUTO-ENTMCNC: 35.8 G/DL (ref 28.4–34.8)
MCV RBC AUTO: 89.1 FL (ref 82.6–102.9)
MONOCYTES NFR BLD: 0.46 K/UL (ref 0.1–1.2)
MONOCYTES NFR BLD: 9 % (ref 3–12)
NEUTROPHILS NFR BLD: 63 % (ref 36–65)
NEUTS SEG NFR BLD: 3.26 K/UL (ref 1.5–8.1)
NRBC BLD-RTO: 0 PER 100 WBC
PLATELET # BLD AUTO: 184 K/UL (ref 138–453)
PMV BLD AUTO: 10.6 FL (ref 8.1–13.5)
PSA SERPL-MCNC: 1.5 NG/ML
RBC # BLD AUTO: 5.24 M/UL (ref 4.21–5.77)
SHBG SERPL-SCNC: 34 NMOL/L (ref 11–80)
TESTOST FREE MFR SERPL: 24.3 PG/ML (ref 47–244)
TESTOST SERPL-MCNC: 133 NG/DL (ref 220–1000)
WBC OTHER # BLD: 5.2 K/UL (ref 3.5–11.3)

## 2024-02-08 LAB
CREAT SERPL-MCNC: 0.75 MG/DL
GFR AFRICAN AMERICAN: 126.9 ML/M1.7
GFR NON-AFRICAN AMERICAN: 104.8 ML/M1.7

## 2024-04-10 ENCOUNTER — HOSPITAL ENCOUNTER (OUTPATIENT)
Age: 64
Setting detail: SPECIMEN
Discharge: HOME | End: 2024-04-10

## 2024-04-10 DIAGNOSIS — R35.1 NOCTURIA: ICD-10-CM

## 2024-04-10 DIAGNOSIS — E29.1 HYPOGONADISM MALE: ICD-10-CM

## 2024-04-10 LAB
ALT SERPL-CCNC: 8 U/L (ref 10–50)
AST SERPL-CCNC: 26 U/L (ref 10–50)
BASOPHILS # BLD: <0.03 K/UL (ref 0–0.2)
BASOPHILS NFR BLD: 0 % (ref 0–2)
EOSINOPHIL # BLD: 0.31 K/UL (ref 0–0.44)
EOSINOPHILS RELATIVE PERCENT: 6 % (ref 1–4)
ERYTHROCYTE [DISTWIDTH] IN BLOOD BY AUTOMATED COUNT: 12.3 % (ref 11.8–14.4)
ESTRADIOL LEVEL: 22 PG/ML (ref 27–52)
HCT VFR BLD AUTO: 45.7 % (ref 40.7–50.3)
HGB BLD-MCNC: 15.8 G/DL (ref 13–17)
IMM GRANULOCYTES # BLD AUTO: <0.03 K/UL (ref 0–0.3)
IMM GRANULOCYTES NFR BLD: 0 %
LYMPHOCYTES NFR BLD: 1.24 K/UL (ref 1.1–3.7)
LYMPHOCYTES RELATIVE PERCENT: 23 % (ref 24–43)
MCH RBC QN AUTO: 32 PG (ref 25.2–33.5)
MCHC RBC AUTO-ENTMCNC: 34.6 G/DL (ref 28.4–34.8)
MCV RBC AUTO: 92.7 FL (ref 82.6–102.9)
MONOCYTES NFR BLD: 0.56 K/UL (ref 0.1–1.2)
MONOCYTES NFR BLD: 10 % (ref 3–12)
NEUTROPHILS NFR BLD: 61 % (ref 36–65)
NEUTS SEG NFR BLD: 3.32 K/UL (ref 1.5–8.1)
NRBC BLD-RTO: 0 PER 100 WBC
PLATELET # BLD AUTO: 171 K/UL (ref 138–453)
PMV BLD AUTO: 11 FL (ref 8.1–13.5)
PSA SERPL-MCNC: 1.6 NG/ML (ref 0–4)
RBC # BLD AUTO: 4.93 M/UL (ref 4.21–5.77)
SHBG SERPL-SCNC: 27 NMOL/L (ref 19–76)
TESTOST FREE MFR SERPL: 144.4 PG/ML (ref 47–244)
TESTOST SERPL-MCNC: 598 NG/DL (ref 193–740)
WBC OTHER # BLD: 5.5 K/UL (ref 3.5–11.3)

## 2024-10-24 ENCOUNTER — HOSPITAL ENCOUNTER (OUTPATIENT)
Age: 64
Setting detail: SPECIMEN
Discharge: HOME | End: 2024-10-24

## 2024-10-24 DIAGNOSIS — R35.1 NOCTURIA: ICD-10-CM

## 2024-10-24 DIAGNOSIS — E29.1 HYPOGONADISM MALE: ICD-10-CM

## 2024-10-24 LAB
ALT SERPL-CCNC: <5 U/L (ref 10–50)
AST SERPL-CCNC: 19 U/L (ref 10–50)
BASOPHILS # BLD: 0.03 K/UL (ref 0–0.2)
BASOPHILS NFR BLD: 1 % (ref 0–2)
EOSINOPHIL # BLD: 0.28 K/UL (ref 0–0.44)
EOSINOPHILS RELATIVE PERCENT: 5 % (ref 1–4)
ERYTHROCYTE [DISTWIDTH] IN BLOOD BY AUTOMATED COUNT: 13.3 % (ref 11.8–14.4)
ESTRADIOL LEVEL: 23 PG/ML (ref 27–52)
HCT VFR BLD AUTO: 46.2 % (ref 40.7–50.3)
HGB BLD-MCNC: 15.9 G/DL (ref 13–17)
IMM GRANULOCYTES # BLD AUTO: <0.03 K/UL (ref 0–0.3)
IMM GRANULOCYTES NFR BLD: 0 %
LYMPHOCYTES NFR BLD: 1.05 K/UL (ref 1.1–3.7)
LYMPHOCYTES RELATIVE PERCENT: 18 % (ref 24–43)
MCH RBC QN AUTO: 31.9 PG (ref 25.2–33.5)
MCHC RBC AUTO-ENTMCNC: 34.4 G/DL (ref 28.4–34.8)
MCV RBC AUTO: 92.6 FL (ref 82.6–102.9)
MONOCYTES NFR BLD: 0.53 K/UL (ref 0.1–1.2)
MONOCYTES NFR BLD: 9 % (ref 3–12)
NEUTROPHILS NFR BLD: 67 % (ref 36–65)
NEUTS SEG NFR BLD: 3.81 K/UL (ref 1.5–8.1)
NRBC BLD-RTO: 0 PER 100 WBC
PLATELET # BLD AUTO: 164 K/UL (ref 138–453)
PMV BLD AUTO: 11.1 FL (ref 8.1–13.5)
PSA SERPL-MCNC: 1.4 NG/ML (ref 0–4)
RBC # BLD AUTO: 4.99 M/UL (ref 4.21–5.77)
SHBG SERPL-SCNC: 29 NMOL/L (ref 19–76)
TESTOST FREE MFR SERPL: 64 PG/ML (ref 47–244)
TESTOST SERPL-MCNC: 302 NG/DL (ref 193–740)
WBC OTHER # BLD: 5.7 K/UL (ref 3.5–11.3)

## 2025-02-18 ENCOUNTER — HOSPITAL ENCOUNTER (OUTPATIENT)
Age: 65
Setting detail: SPECIMEN
Discharge: HOME | End: 2025-02-18

## 2025-02-18 DIAGNOSIS — R53.83 OTHER FATIGUE: ICD-10-CM

## 2025-02-18 DIAGNOSIS — E55.9 VITAMIN D DEFICIENCY: ICD-10-CM

## 2025-02-18 DIAGNOSIS — E16.2 HYPOGLYCEMIA: ICD-10-CM

## 2025-02-18 LAB
25(OH)D3 SERPL-MCNC: 44.5 NG/ML (ref 30–100)
EST. AVERAGE GLUCOSE BLD GHB EST-MCNC: 94 MG/DL
FOLATE SERPL-MCNC: 14.2 NG/ML (ref 4.8–24.2)
HBA1C MFR BLD: 4.9 % (ref 4–6)
T4 FREE SERPL-MCNC: 1.2 NG/DL (ref 0.92–1.68)
TSH SERPL DL<=0.05 MIU/L-ACNC: 2.36 UIU/ML (ref 0.27–4.2)
VIT B12 SERPL-MCNC: 329 PG/ML (ref 232–1245)

## 2025-07-28 ENCOUNTER — HOSPITAL ENCOUNTER (OUTPATIENT)
Dept: GENERAL RADIOLOGY | Facility: CLINIC | Age: 65
Discharge: HOME | End: 2025-07-30
Payer: MEDICARE

## 2025-07-28 DIAGNOSIS — R63.4 WEIGHT LOSS: ICD-10-CM

## 2025-07-28 PROCEDURE — 71046 X-RAY EXAM CHEST 2 VIEWS: CPT

## 2025-08-14 ENCOUNTER — HOSPITAL ENCOUNTER (OUTPATIENT)
Dept: LAB | Facility: CLINIC | Age: 65
Discharge: HOME | End: 2025-08-14
Payer: MEDICARE

## 2025-08-14 DIAGNOSIS — K63.8212: ICD-10-CM

## 2025-08-14 DIAGNOSIS — R11.0 NAUSEA: ICD-10-CM

## 2025-08-14 DIAGNOSIS — E29.1 HYPOGONADISM MALE: ICD-10-CM

## 2025-08-14 DIAGNOSIS — R53.83 OTHER FATIGUE: ICD-10-CM

## 2025-08-14 DIAGNOSIS — R35.1 NOCTURIA: ICD-10-CM

## 2025-08-14 DIAGNOSIS — E78.5 HYPERLIPIDEMIA, UNSPECIFIED HYPERLIPIDEMIA TYPE: ICD-10-CM

## 2025-08-14 DIAGNOSIS — R10.11 RIGHT UPPER QUADRANT PAIN: ICD-10-CM

## 2025-08-14 DIAGNOSIS — E55.9 VITAMIN D DEFICIENCY: ICD-10-CM

## 2025-08-14 DIAGNOSIS — E16.2 HYPOGLYCEMIA: ICD-10-CM

## 2025-08-14 DIAGNOSIS — R63.4 WEIGHT LOSS: ICD-10-CM

## 2025-08-14 LAB
25(OH)D3 SERPL-MCNC: 40.2 NG/ML (ref 30–100)
ALT SERPL-CCNC: 6 U/L (ref 10–50)
ANION GAP SERPL CALCULATED.3IONS-SCNC: 9 MMOL/L (ref 9–16)
AST SERPL-CCNC: 19 U/L (ref 10–50)
BASOPHILS # BLD: 0.03 K/UL (ref 0–0.2)
BASOPHILS NFR BLD: 1 % (ref 0–2)
BUN SERPL-MCNC: 20 MG/DL (ref 8–23)
CALCIUM SERPL-MCNC: 9.1 MG/DL (ref 8.6–10.4)
CHLORIDE SERPL-SCNC: 104 MMOL/L (ref 98–107)
CHOLEST SERPL-MCNC: 167 MG/DL (ref 0–199)
CHOLESTEROL/HDL RATIO: 2.4
CO2 SERPL-SCNC: 26 MMOL/L (ref 20–31)
CREAT SERPL-MCNC: 0.8 MG/DL (ref 0.7–1.2)
CRP SERPL HS-MCNC: 0.2 MG/L (ref 0–3)
EOSINOPHIL # BLD: 0.16 K/UL (ref 0–0.44)
EOSINOPHILS RELATIVE PERCENT: 3 % (ref 1–4)
ERYTHROCYTE [DISTWIDTH] IN BLOOD BY AUTOMATED COUNT: 12.7 % (ref 11.8–14.4)
ERYTHROCYTE [SEDIMENTATION RATE] IN BLOOD BY PHOTOMETRIC METHOD: 7 MM/HR (ref 0–20)
EST. AVERAGE GLUCOSE BLD GHB EST-MCNC: 91 MG/DL
FOLATE SERPL-MCNC: 16.3 NG/ML (ref 4.8–24.2)
GFR, ESTIMATED: >90 ML/MIN/1.73M2
GLUCOSE P FAST SERPL-MCNC: 90 MG/DL (ref 74–99)
HBA1C MFR BLD: 4.8 % (ref 4–6)
HCT VFR BLD AUTO: 42.7 % (ref 40.7–50.3)
HDLC SERPL-MCNC: 71 MG/DL
HGB BLD-MCNC: 14.5 G/DL (ref 13–17)
IMM GRANULOCYTES # BLD AUTO: <0.03 K/UL (ref 0–0.3)
IMM GRANULOCYTES NFR BLD: 0 %
LDLC SERPL CALC-MCNC: 85 MG/DL (ref 0–100)
LIPASE SERPL-CCNC: 35 U/L (ref 13–60)
LYMPHOCYTES NFR BLD: 1.08 K/UL (ref 1.1–3.7)
LYMPHOCYTES RELATIVE PERCENT: 21 % (ref 24–43)
MCH RBC QN AUTO: 30.7 PG (ref 25.2–33.5)
MCHC RBC AUTO-ENTMCNC: 34 G/DL (ref 28.4–34.8)
MCV RBC AUTO: 90.3 FL (ref 82.6–102.9)
MONOCYTES NFR BLD: 0.52 K/UL (ref 0.1–1.2)
MONOCYTES NFR BLD: 10 % (ref 3–12)
NEUTROPHILS NFR BLD: 65 % (ref 36–65)
NEUTS SEG NFR BLD: 3.37 K/UL (ref 1.5–8.1)
NRBC BLD-RTO: 0 PER 100 WBC
PLATELET # BLD AUTO: 173 K/UL (ref 138–453)
PMV BLD AUTO: 10.1 FL (ref 8.1–13.5)
POTASSIUM SERPL-SCNC: 4.1 MMOL/L (ref 3.7–5.3)
PSA SERPL-MCNC: 1.61 NG/ML (ref 0–4)
RBC # BLD AUTO: 4.73 M/UL (ref 4.21–5.77)
SHBG SERPL-SCNC: 35 NMOL/L (ref 19–76)
SODIUM SERPL-SCNC: 139 MMOL/L (ref 136–145)
T4 FREE SERPL-MCNC: 1.1 NG/DL (ref 0.92–1.68)
TESTOST FREE MFR SERPL: 54.1 PG/ML (ref 47–244)
TESTOST SERPL-MCNC: 286 NG/DL (ref 193–740)
TRIGL SERPL-MCNC: 53 MG/DL
TSH SERPL DL<=0.05 MIU/L-ACNC: 2.3 UIU/ML (ref 0.27–4.2)
VIT B12 SERPL-MCNC: 338 PG/ML (ref 232–1245)
VLDLC SERPL CALC-MCNC: 11 MG/DL (ref 1–30)
WBC OTHER # BLD: 5.2 K/UL (ref 3.5–11.3)

## 2025-08-14 PROCEDURE — 84450 TRANSFERASE (AST) (SGOT): CPT

## 2025-08-14 PROCEDURE — 84403 ASSAY OF TOTAL TESTOSTERONE: CPT

## 2025-08-14 PROCEDURE — 80048 BASIC METABOLIC PNL TOTAL CA: CPT

## 2025-08-14 PROCEDURE — 84443 ASSAY THYROID STIM HORMONE: CPT

## 2025-08-14 PROCEDURE — 85025 COMPLETE CBC W/AUTO DIFF WBC: CPT

## 2025-08-14 PROCEDURE — 86141 C-REACTIVE PROTEIN HS: CPT

## 2025-08-14 PROCEDURE — 82306 VITAMIN D 25 HYDROXY: CPT

## 2025-08-14 PROCEDURE — 84439 ASSAY OF FREE THYROXINE: CPT

## 2025-08-14 PROCEDURE — 82607 VITAMIN B-12: CPT

## 2025-08-14 PROCEDURE — 83690 ASSAY OF LIPASE: CPT

## 2025-08-14 PROCEDURE — 80061 LIPID PANEL: CPT

## 2025-08-14 PROCEDURE — 84270 ASSAY OF SEX HORMONE GLOBUL: CPT

## 2025-08-14 PROCEDURE — 82746 ASSAY OF FOLIC ACID SERUM: CPT

## 2025-08-14 PROCEDURE — 84153 ASSAY OF PSA TOTAL: CPT

## 2025-08-14 PROCEDURE — 36415 COLL VENOUS BLD VENIPUNCTURE: CPT

## 2025-08-14 PROCEDURE — 85652 RBC SED RATE AUTOMATED: CPT

## 2025-08-14 PROCEDURE — 84460 ALANINE AMINO (ALT) (SGPT): CPT

## 2025-08-14 PROCEDURE — 83036 HEMOGLOBIN GLYCOSYLATED A1C: CPT

## 2025-08-18 ENCOUNTER — HOSPITAL ENCOUNTER (OUTPATIENT)
Dept: LAB | Facility: CLINIC | Age: 65
Discharge: HOME | End: 2025-08-18
Payer: MEDICARE

## 2025-08-18 DIAGNOSIS — K63.8212: ICD-10-CM

## 2025-08-18 PROCEDURE — 87506 IADNA-DNA/RNA PROBE TQ 6-11: CPT

## 2025-08-19 LAB
CAMPYLOBACTER DNA SPEC NAA+PROBE: NORMAL
ETEC ELTA+ESTB GENES STL QL NAA+PROBE: NORMAL
P SHIGELLOIDES DNA STL QL NAA+PROBE: NORMAL
SALMONELLA DNA SPEC QL NAA+PROBE: NORMAL
SHIGA TOXIN STX GENE SPEC NAA+PROBE: NORMAL
SHIGELLA DNA SPEC QL NAA+PROBE: NORMAL
SPECIMEN DESCRIPTION: NORMAL
V CHOL+PARA RFBL+TRKH+TNAA STL QL NAA+PR: NORMAL
Y ENTERO RECN STL QL NAA+PROBE: NORMAL